# Patient Record
Sex: FEMALE | Race: ASIAN | NOT HISPANIC OR LATINO | Employment: UNEMPLOYED | URBAN - METROPOLITAN AREA
[De-identification: names, ages, dates, MRNs, and addresses within clinical notes are randomized per-mention and may not be internally consistent; named-entity substitution may affect disease eponyms.]

---

## 2018-06-12 ENCOUNTER — HOSPITAL ENCOUNTER (OUTPATIENT)
Dept: LAB | Facility: MEDICAL CENTER | Age: 63
End: 2018-06-12
Attending: FAMILY MEDICINE

## 2018-06-12 ENCOUNTER — HOSPITAL ENCOUNTER (OUTPATIENT)
Facility: MEDICAL CENTER | Age: 63
End: 2018-06-12
Attending: FAMILY MEDICINE

## 2018-06-18 LAB — HEMOCCULT STL QL IA: NEGATIVE

## 2021-02-26 ENCOUNTER — PATIENT OUTREACH (OUTPATIENT)
Dept: OTHER | Facility: MEDICAL CENTER | Age: 66
End: 2021-02-26

## 2021-02-26 NOTE — LETTER
[facility]   [address]  [place] [address]  Phone: [phone] - Fax: [phone]              Address:  [place] [address]     Date: 02/26/21  Medical Record Number: [id number]    Dear Nain,    I am a Cancer Nurse Navigator, a certified oncology nurse. My role is to assess any needs you may have with education, guidance and support. I am available to you and your family from diagnosis through your survivorship.       I am available to address your needs during your journey with the following services:     Care Coordination  I can assist you in facilitating communication between your cancer care treatment team to ensure timely treatment and follow-up.  I can also assist with transition of care back to your primary care provider, or other specialist, as needed.  My goal is to bridge gaps for you throughout the course of your active treatment.       Education Services  Understanding the recommended treatment course by your physician is key. I can provide educational resources personalized to your cancer diagnosis to help you understand your diagnosis and treatment. Please let me know if you would like to receive information about your diagnosis and treatment plan.  I am here to help.     Support Services/Resource Information  [facility] we offer a full scope of support services.  I can assist you with referral information to:  · Cancer Clinical Trials & Research  · Nutrition counseling  · Support groups  · Complementary Therapies such as Healing Touch and Mind-Body Techniques Meditation  · Patient Financial Advocates  ·   · [facility], an [employer] affiliate office, our volunteers can assist you with accessing our Prescription Eyewearing library, support services information, head coverings and comfort items  · Community and national resources, included eligibility based elena assistance and pharmaceutical access programs,  if you are in need of additional information.     [facility] offers services that include:  · Behavioral Health  · Genetic counseling & testing  · Acupuncture  · Lymphedema prevention/treatment program  · Palliative care services.       I hope you have an excellent patient experience.  Please feel free to share with me your comments regarding the care you have received- we value your feedback.    Sincerely,       Raji Pratt R.N.  Cancer Nurse Navigator    Office: [phone]   Email: [email or web address]    If you would like to attend our Breast Cancer Newly Diagnosed Class please call [phone] or visit [email or web address] to register.

## 2021-03-03 DIAGNOSIS — Z23 NEED FOR VACCINATION: ICD-10-CM

## 2021-03-09 ENCOUNTER — PATIENT OUTREACH (OUTPATIENT)
Dept: OTHER | Facility: MEDICAL CENTER | Age: 66
End: 2021-03-09

## 2021-03-09 DIAGNOSIS — Z65.8 PSYCHOSOCIAL DISTRESS: ICD-10-CM

## 2021-03-09 NOTE — PROGRESS NOTES
"On March 9, 2021, Oncology Social Worker Jeanette Richardson and Oncology Nurse Navigator Raji Pratt contacted pt. via telephone.  Pt. asked for SELINA Richardson and PAULETTE Pratt to speak to her daughter, Elieser as her English is not as good as her daughter's.  Pt.'s daughter shared they are waiting for price for surgery from [facility].  Elieser shared they were quoted price to be around $4000 and would be assisted with $1000 from [facility].      Pt. lives with her  who works and often takes care of her great grandson Chon (7 years old).  Pt. does not work and receives $700/month from social security.  Elieser shared there are grandchildren and pt.'s adult children who can drive her to and from appointments.      Pt. shared she was a 5 on psychosocial distress screening re: \"not too much stress/I try not to get depressed\"      Pt. shared she is depressed.     Pt.'s primary language is Amharic.      SELINA Richardson will be mailing out [facility] application for pt. to complete.  SELINA Richardson encouraged pt.'s daughter and pt. to call if they have any concerns.  SELINA Richardson provided pt.'s daughter with her contact information and the contact information for PAULETTE Pratt.    "

## 2021-03-11 ENCOUNTER — HOSPITAL ENCOUNTER (OUTPATIENT)
Facility: MEDICAL CENTER | Age: 66
End: 2021-03-11
Attending: SURGERY
Payer: COMMERCIAL

## 2021-03-11 LAB — PATHOLOGY CONSULT NOTE: NORMAL

## 2021-03-11 NOTE — PROGRESS NOTES
Oncology Nurse Parish Pratt and Oncology Social Worker Jeanette Richardson phoned pt.  Pt's primary language is Esa.  Pt requested we speak with daughter Elieser.  Pt awaiting final quote for surgery from [facility].  Pt is established with Dr. Bailon.  Daughter states that [facility] may be able to provide some financial assistance.  OSW Richardson discussed additional resources.  Pt lives with her .  She has family available for support and to assist with transportation.  Requested pt / daughter call should any questions or concerns arise.

## 2021-03-18 ENCOUNTER — PRE-ADMISSION TESTING (OUTPATIENT)
Dept: ADMISSIONS | Facility: MEDICAL CENTER | Age: 66
End: 2021-03-18
Attending: SURGERY
Payer: COMMERCIAL

## 2021-03-18 DIAGNOSIS — Z01.812 PRE-OPERATIVE LABORATORY EXAMINATION: ICD-10-CM

## 2021-03-18 LAB
SARS-COV-2 RNA RESP QL NAA+PROBE: NOTDETECTED
SPECIMEN SOURCE: NORMAL

## 2021-03-18 PROCEDURE — U0005 INFEC AGEN DETEC AMPLI PROBE: HCPCS

## 2021-03-18 PROCEDURE — C9803 HOPD COVID-19 SPEC COLLECT: HCPCS

## 2021-03-18 PROCEDURE — U0003 INFECTIOUS AGENT DETECTION BY NUCLEIC ACID (DNA OR RNA); SEVERE ACUTE RESPIRATORY SYNDROME CORONAVIRUS 2 (SARS-COV-2) (CORONAVIRUS DISEASE [COVID-19]), AMPLIFIED PROBE TECHNIQUE, MAKING USE OF HIGH THROUGHPUT TECHNOLOGIES AS DESCRIBED BY CMS-2020-01-R: HCPCS

## 2021-03-22 NOTE — OR NURSING
COVID-19 Pre-surgery screening:    Pt did not answer generic voicemail was left with call back number.

## 2021-03-23 ENCOUNTER — ANESTHESIA EVENT (OUTPATIENT)
Dept: SURGERY | Facility: MEDICAL CENTER | Age: 66
End: 2021-03-23
Payer: COMMERCIAL

## 2021-03-23 ENCOUNTER — APPOINTMENT (OUTPATIENT)
Dept: RADIOLOGY | Facility: MEDICAL CENTER | Age: 66
End: 2021-03-23
Attending: SURGERY
Payer: COMMERCIAL

## 2021-03-23 ENCOUNTER — ANESTHESIA (OUTPATIENT)
Dept: SURGERY | Facility: MEDICAL CENTER | Age: 66
End: 2021-03-23
Payer: COMMERCIAL

## 2021-03-23 ENCOUNTER — HOSPITAL ENCOUNTER (OUTPATIENT)
Facility: MEDICAL CENTER | Age: 66
End: 2021-03-23
Attending: SURGERY | Admitting: SURGERY
Payer: COMMERCIAL

## 2021-03-23 VITALS
HEIGHT: 63 IN | WEIGHT: 160.5 LBS | BODY MASS INDEX: 28.44 KG/M2 | OXYGEN SATURATION: 100 % | SYSTOLIC BLOOD PRESSURE: 139 MMHG | DIASTOLIC BLOOD PRESSURE: 66 MMHG | TEMPERATURE: 98.4 F | HEART RATE: 95 BPM | RESPIRATION RATE: 16 BRPM

## 2021-03-23 DIAGNOSIS — C50.211 MALIGNANT NEOPLASM OF UPPER-INNER QUADRANT OF RIGHT FEMALE BREAST, UNSPECIFIED ESTROGEN RECEPTOR STATUS (HCC): ICD-10-CM

## 2021-03-23 PROCEDURE — 160035 HCHG PACU - 1ST 60 MINS PHASE I: Performed by: SURGERY

## 2021-03-23 PROCEDURE — 160025 RECOVERY II MINUTES (STATS): Performed by: SURGERY

## 2021-03-23 PROCEDURE — 160009 HCHG ANES TIME/MIN: Performed by: SURGERY

## 2021-03-23 PROCEDURE — 88331 PATH CONSLTJ SURG 1 BLK 1SPC: CPT

## 2021-03-23 PROCEDURE — 88307 TISSUE EXAM BY PATHOLOGIST: CPT

## 2021-03-23 PROCEDURE — 501838 HCHG SUTURE GENERAL: Performed by: SURGERY

## 2021-03-23 PROCEDURE — 160046 HCHG PACU - 1ST 60 MINS PHASE II: Performed by: SURGERY

## 2021-03-23 PROCEDURE — 160041 HCHG SURGERY MINUTES - EA ADDL 1 MIN LEVEL 4: Performed by: SURGERY

## 2021-03-23 PROCEDURE — A9270 NON-COVERED ITEM OR SERVICE: HCPCS | Performed by: ANESTHESIOLOGY

## 2021-03-23 PROCEDURE — 38792 RA TRACER ID OF SENTINL NODE: CPT | Mod: RT

## 2021-03-23 PROCEDURE — 700111 HCHG RX REV CODE 636 W/ 250 OVERRIDE (IP): Performed by: ANESTHESIOLOGY

## 2021-03-23 PROCEDURE — 700102 HCHG RX REV CODE 250 W/ 637 OVERRIDE(OP): Performed by: ANESTHESIOLOGY

## 2021-03-23 PROCEDURE — 76098 X-RAY EXAM SURGICAL SPECIMEN: CPT | Mod: RT

## 2021-03-23 PROCEDURE — 160047 HCHG PACU  - EA ADDL 30 MINS PHASE II: Performed by: SURGERY

## 2021-03-23 PROCEDURE — 160029 HCHG SURGERY MINUTES - 1ST 30 MINS LEVEL 4: Performed by: SURGERY

## 2021-03-23 PROCEDURE — 160048 HCHG OR STATISTICAL LEVEL 1-5: Performed by: SURGERY

## 2021-03-23 PROCEDURE — 700101 HCHG RX REV CODE 250: Performed by: SURGERY

## 2021-03-23 PROCEDURE — 160002 HCHG RECOVERY MINUTES (STAT): Performed by: SURGERY

## 2021-03-23 RX ORDER — CEFAZOLIN SODIUM 1 G/3ML
INJECTION, POWDER, FOR SOLUTION INTRAMUSCULAR; INTRAVENOUS PRN
Status: DISCONTINUED | OUTPATIENT
Start: 2021-03-23 | End: 2021-03-23 | Stop reason: SURG

## 2021-03-23 RX ORDER — ONDANSETRON 2 MG/ML
4 INJECTION INTRAMUSCULAR; INTRAVENOUS
Status: DISCONTINUED | OUTPATIENT
Start: 2021-03-23 | End: 2021-03-23 | Stop reason: HOSPADM

## 2021-03-23 RX ORDER — OXYCODONE HCL 5 MG/5 ML
10 SOLUTION, ORAL ORAL
Status: COMPLETED | OUTPATIENT
Start: 2021-03-23 | End: 2021-03-23

## 2021-03-23 RX ORDER — SODIUM CHLORIDE, SODIUM LACTATE, POTASSIUM CHLORIDE, CALCIUM CHLORIDE 600; 310; 30; 20 MG/100ML; MG/100ML; MG/100ML; MG/100ML
INJECTION, SOLUTION INTRAVENOUS CONTINUOUS
Status: DISCONTINUED | OUTPATIENT
Start: 2021-03-23 | End: 2021-03-23 | Stop reason: HOSPADM

## 2021-03-23 RX ORDER — ONDANSETRON 4 MG/1
4 TABLET, FILM COATED ORAL EVERY 8 HOURS PRN
Qty: 9 TABLET | Refills: 2 | Status: SHIPPED | OUTPATIENT
Start: 2021-03-23 | End: 2021-03-26

## 2021-03-23 RX ORDER — HALOPERIDOL 5 MG/ML
1 INJECTION INTRAMUSCULAR
Status: DISCONTINUED | OUTPATIENT
Start: 2021-03-23 | End: 2021-03-23 | Stop reason: HOSPADM

## 2021-03-23 RX ORDER — BUPIVACAINE HYDROCHLORIDE AND EPINEPHRINE 5; 5 MG/ML; UG/ML
INJECTION, SOLUTION EPIDURAL; INTRACAUDAL; PERINEURAL
Status: DISCONTINUED | OUTPATIENT
Start: 2021-03-23 | End: 2021-03-23 | Stop reason: HOSPADM

## 2021-03-23 RX ORDER — ISOSULFAN BLUE 50 MG/5ML
INJECTION, SOLUTION SUBCUTANEOUS
Status: DISPENSED
Start: 2021-03-23 | End: 2021-03-23

## 2021-03-23 RX ORDER — SODIUM CHLORIDE, SODIUM LACTATE, POTASSIUM CHLORIDE, CALCIUM CHLORIDE 600; 310; 30; 20 MG/100ML; MG/100ML; MG/100ML; MG/100ML
INJECTION, SOLUTION INTRAVENOUS CONTINUOUS
Status: ACTIVE | OUTPATIENT
Start: 2021-03-23 | End: 2021-03-23

## 2021-03-23 RX ORDER — ONDANSETRON 2 MG/ML
INJECTION INTRAMUSCULAR; INTRAVENOUS PRN
Status: DISCONTINUED | OUTPATIENT
Start: 2021-03-23 | End: 2021-03-23 | Stop reason: SURG

## 2021-03-23 RX ORDER — HYDROMORPHONE HYDROCHLORIDE 1 MG/ML
0.1 INJECTION, SOLUTION INTRAMUSCULAR; INTRAVENOUS; SUBCUTANEOUS
Status: DISCONTINUED | OUTPATIENT
Start: 2021-03-23 | End: 2021-03-23 | Stop reason: HOSPADM

## 2021-03-23 RX ORDER — DEXAMETHASONE SODIUM PHOSPHATE 4 MG/ML
INJECTION, SOLUTION INTRA-ARTICULAR; INTRALESIONAL; INTRAMUSCULAR; INTRAVENOUS; SOFT TISSUE PRN
Status: DISCONTINUED | OUTPATIENT
Start: 2021-03-23 | End: 2021-03-23 | Stop reason: SURG

## 2021-03-23 RX ORDER — OXYCODONE HCL 5 MG/5 ML
5 SOLUTION, ORAL ORAL
Status: COMPLETED | OUTPATIENT
Start: 2021-03-23 | End: 2021-03-23

## 2021-03-23 RX ORDER — HYDROCODONE BITARTRATE AND ACETAMINOPHEN 5; 325 MG/1; MG/1
1 TABLET ORAL EVERY 4 HOURS PRN
Qty: 18 TABLET | Refills: 0 | Status: SHIPPED | OUTPATIENT
Start: 2021-03-23 | End: 2021-03-26

## 2021-03-23 RX ORDER — DIPHENHYDRAMINE HYDROCHLORIDE 50 MG/ML
12.5 INJECTION INTRAMUSCULAR; INTRAVENOUS
Status: DISCONTINUED | OUTPATIENT
Start: 2021-03-23 | End: 2021-03-23 | Stop reason: HOSPADM

## 2021-03-23 RX ORDER — HYDROMORPHONE HYDROCHLORIDE 1 MG/ML
0.2 INJECTION, SOLUTION INTRAMUSCULAR; INTRAVENOUS; SUBCUTANEOUS
Status: DISCONTINUED | OUTPATIENT
Start: 2021-03-23 | End: 2021-03-23 | Stop reason: HOSPADM

## 2021-03-23 RX ORDER — HYDROMORPHONE HYDROCHLORIDE 1 MG/ML
0.4 INJECTION, SOLUTION INTRAMUSCULAR; INTRAVENOUS; SUBCUTANEOUS
Status: DISCONTINUED | OUTPATIENT
Start: 2021-03-23 | End: 2021-03-23 | Stop reason: HOSPADM

## 2021-03-23 RX ORDER — ALPRAZOLAM 0.25 MG/1
.25-.5 TABLET ORAL
Status: DISCONTINUED | OUTPATIENT
Start: 2021-03-23 | End: 2021-03-23 | Stop reason: HOSPADM

## 2021-03-23 RX ORDER — BUPIVACAINE HYDROCHLORIDE 5 MG/ML
INJECTION, SOLUTION EPIDURAL; INTRACAUDAL
Status: DISPENSED
Start: 2021-03-23 | End: 2021-03-23

## 2021-03-23 RX ORDER — LIDOCAINE AND PRILOCAINE 25; 25 MG/G; MG/G
CREAM TOPICAL
Status: COMPLETED | OUTPATIENT
Start: 2021-03-23 | End: 2021-03-23

## 2021-03-23 RX ADMIN — PROPOFOL 200 MG: 10 INJECTION, EMULSION INTRAVENOUS at 16:16

## 2021-03-23 RX ADMIN — CEFAZOLIN 2 G: 330 INJECTION, POWDER, FOR SOLUTION INTRAMUSCULAR; INTRAVENOUS at 16:03

## 2021-03-23 RX ADMIN — FENTANYL CITRATE 150 MCG: 50 INJECTION, SOLUTION INTRAMUSCULAR; INTRAVENOUS at 16:20

## 2021-03-23 RX ADMIN — LIDOCAINE AND PRILOCAINE 2.5 %: 25; 25 CREAM TOPICAL at 10:10

## 2021-03-23 RX ADMIN — ONDANSETRON 4 MG: 2 INJECTION INTRAMUSCULAR; INTRAVENOUS at 16:16

## 2021-03-23 RX ADMIN — FENTANYL CITRATE 100 MCG: 50 INJECTION, SOLUTION INTRAMUSCULAR; INTRAVENOUS at 16:16

## 2021-03-23 RX ADMIN — DEXAMETHASONE SODIUM PHOSPHATE 8 MG: 4 INJECTION, SOLUTION INTRA-ARTICULAR; INTRALESIONAL; INTRAMUSCULAR; INTRAVENOUS; SOFT TISSUE at 16:16

## 2021-03-23 RX ADMIN — OXYCODONE HYDROCHLORIDE 5 MG: 5 SOLUTION ORAL at 18:10

## 2021-03-23 ASSESSMENT — PAIN DESCRIPTION - PAIN TYPE
TYPE: SURGICAL PAIN

## 2021-03-23 ASSESSMENT — PAIN SCALES - GENERAL: PAIN_LEVEL: 3

## 2021-03-23 NOTE — ANESTHESIA PREPROCEDURE EVALUATION
Relevant Problems   No relevant active problems       Physical Exam    Airway   Mallampati: I  TM distance: >3 FB  Neck ROM: full       Cardiovascular   Rhythm: regular  Rate: normal     Dental - normal exam           Pulmonary   Breath sounds clear to auscultation     Abdominal - normal exam     Neurological - normal exam                 Anesthesia Plan    ASA 1       Plan - general       Airway plan will be LMA        Plan Factors:   Patient was not previously instructed to abstain from smoking on day of procedure.  Patient did not smoke on day of procedure.      Induction: intravenous          Informed Consent:    Anesthetic plan and risks discussed with patient.    Use of blood products discussed with: patient whom consented to blood products.

## 2021-03-23 NOTE — ANESTHESIA PROCEDURE NOTES
Airway    Date/Time: 3/23/2021 4:15 PM  Performed by: Yobany Galvan M.D.  Authorized by: Yobany Galvan M.D.     Location:  OR  Urgency:  Elective  Difficult Airway: No    Indications for Airway Management:  Anesthesia      Spontaneous Ventilation: present    Sedation Level:  Deep  Preoxygenated: Yes    Patient Position:  Sniffing  MILS Maintained Throughout: No    Mask Difficulty Assessment:  0 - not attempted  Final Airway Type:  Supraglottic airway  Final Supraglottic Airway:  Standard LMA    SGA Size:  4  Number of Attempts at Approach:  1

## 2021-03-24 LAB — PATHOLOGY CONSULT NOTE: NORMAL

## 2021-03-24 NOTE — OR NURSING
1737-Received from OR via [employer]. S/P-Right partial mastectomy. Gauze and tegaderm intact.   Bedside report received from RN. And Anesthesiologist Dr Galvan    1800-Remains asleep. vss per baseline.    1810-medicated for pain.    1815-phase 1 complete. Called  and arranged time for .    1930-meets discharge criteria. Iv removed. Instructions explained to  and patient.  All questions answered. Discharged home with responsible party. Escorted to car via wheelchair.

## 2021-03-24 NOTE — DISCHARGE INSTRUCTIONS
ACTIVITY: Rest and take it easy for the first 24 hours.  A responsible adult is recommended to remain with you during that time.  It is normal to feel sleepy.  We encourage you to not do anything that requires balance, judgment or coordination.    MILD FLU-LIKE SYMPTOMS ARE NORMAL. YOU MAY EXPERIENCE GENERALIZED MUSCLE ACHES, THROAT IRRITATION, HEADACHE AND/OR SOME NAUSEA.    FOR 24 HOURS DO NOT:  Drive, operate machinery or run household appliances.  Drink beer or alcoholic beverages.   Make important decisions or sign legal documents.    SPECIAL INSTRUCTIONS:   May shower but no baths for 4 weeks.    Ambulate regularly.    Sports bra for 2 weeks    DIET: To avoid nausea, slowly advance diet as tolerated, avoiding spicy or greasy foods for the first day.  Add more substantial food to your diet according to your physician's instructions.  Babies can be fed formula or breast milk as soon as they are hungry.  INCREASE FLUIDS AND FIBER TO AVOID CONSTIPATION.        FOLLOW-UP APPOINTMENT:  A follow-up appointment should be arranged with your doctor; call to schedule.    You should CALL YOUR PHYSICIAN if you develop:  Fever greater than 101 degrees F.  Pain not relieved by medication, or persistent nausea or vomiting.  Excessive bleeding (blood soaking through dressing) or unexpected drainage from the wound.  Extreme redness or swelling around the incision site, drainage of pus or foul smelling drainage.  Inability to urinate or empty your bladder within 8 hours.  Problems with breathing or chest pain.    You should call 911 if you develop problems with breathing or chest pain.  If you are unable to contact your doctor or surgical center, you should go to the nearest emergency room or urgent care center.  Physician's telephone #: [phone] Dr Bailon    If any questions arise, call your doctor.  If your doctor is not available, please feel free to call the Surgical Center at [phone]. The Contact Center is open  Monday through Friday 7AM to 5PM and may speak to a nurse at [phone], or toll free at [phone].     A registered nurse may call you a few days after your surgery to see how you are doing after your procedure.    MEDICATIONS: Resume taking daily medication.  Take prescribed pain medication with food.  If no medication is prescribed, you may take non-aspirin pain medication if needed.  PAIN MEDICATION CAN BE VERY CONSTIPATING.  Take a stool softener or laxative such as senokot, pericolace, or milk of magnesia if needed.    Prescription given for Norco and Zofran.  Last pain medication given at 6:00pm. You can take your next dose of Norco at 10:00pm.    If your physician has prescribed pain medication that includes Acetaminophen (Tylenol), do not take additional Acetaminophen (Tylenol) while taking the prescribed medication.    Depression / Suicide Risk    As you are discharged from this [facility] facility, it is important to learn how to keep safe from harming yourself.    Recognize the warning signs:  · Abrupt changes in personality, positive or negative- including increase in energy   · Giving away possessions  · Change in eating patterns- significant weight changes-  positive or negative  · Change in sleeping patterns- unable to sleep or sleeping all the time   · Unwillingness or inability to communicate  · Depression  · Unusual sadness, discouragement and loneliness  · Talk of wanting to die  · Neglect of personal appearance   · Rebelliousness- reckless behavior  · Withdrawal from people/activities they love  · Confusion- inability to concentrate     If you or a loved one observes any of these behaviors or has concerns about self-harm, here's what you can do:  · Talk about it- your feelings and reasons for harming yourself  · Remove any means that you might use to hurt yourself (examples: pills, rope, extension cords, firearm)  · Get professional help from the community (Mental Health, Substance  Abuse, psychological counseling)  · Do not be alone:Call your Safe Contact- someone whom you trust who will be there for you.  · Call your local CRISIS HOTLINE 408-0366 or 925-745-9337  · Call your local Children's Mobile Crisis Response Team [place] (494) 943-0205 or www.[employer].com  · Call the toll free National Suicide Prevention Hotlines   · National Suicide Prevention Lifeline 648-779-DBZL (6352)  · National Hope Line Network 800-SUICIDE (381-5594)

## 2021-03-24 NOTE — OP REPORT
Pre op diagnosis:  Malignant neoplasm right upper inner breast  Post op diagnosis:  Same    Procedure:   1.  Right partial mastectomy  2.  Right sentinel lymph node mapping with excision of deep axillary nodes    Surgeon:  Johnna Bailon MD  Assistant:  Kirti Thompson CFA  Anesthesiologist:  Yobany Galvan MD    Anesthesia:  General  Pre op meds:  Ancef  ASA 2    Indications:  This is a 66 year old female with right breast cancer, cT2 cN0, involving the dermis focally.  After discussing risks and benefits, she is ready to proceed with breast conserving surgery, but declines reconstruction.  She indicates that she is unconcerned about cosmetic outcome and prefers a wide excision.    Findings:  1.  1 of 2 sentinel nodes, without gross extranodal extension.  Counts to 220.  No residual palpable nodes.  2.  Margins grossly clear.    Summary:  The patient underwent radiotracer injection, then was anesthetized, prepped, and draped in sterile fashion.  Time out was confirmed.  Local anesthetic was injected prior to all incisions.      I made an axillary incision following her skin lines ,then dissected through the clavipectoral fascia.  The gamma probe was used to locate the sentinel nodes which were excised using the Harmonic scalpel.  These were evaluated by the pathologist.  Based on current data, risk of ALND was felt to outweigh the benefit, and I irrigated and ensured hemostasis.  I closed the fascia and deep dermal layer an 4-0 monocryl was used to close the skin.      I then made a crescent shaped excision of skin over the palpable mass and, per her wishes, widely excised around the palpable mass.  Specimen mammogram was performed, and the pathologist also evaluated the specimen. Margins were felt to be grossly clear.  I irrigated and ensured hemostasis, then placed clips.  I reapproximated the parenchyma to fill the defect, closed the deep dermal layer and 4-0 monocryl was used for skin.  Dressings were placed and I was  informed all counts were correct.  I required assistance for retraction and best exposure.       CC: Brandee Jones PA-C

## 2021-03-24 NOTE — ANESTHESIA TIME REPORT
Anesthesia Start and Stop Event Times     Date Time Event    3/23/2021 1556 Ready for Procedure     1603 Anesthesia Start     1738 Anesthesia Stop        Responsible Staff  03/23/21    Name Role Begin End    Yobany Galvan M.D. Anesth 1603 1738        Preop Diagnosis (Free Text):  Pre-op Diagnosis     RIGHT BREAST CANCER        Preop Diagnosis (Codes):    Post op Diagnosis  Breast cancer (HCC)      Premium Reason  A. 3PM - 7AM    Comments:

## 2021-03-24 NOTE — ANESTHESIA POSTPROCEDURE EVALUATION
Patient: Nain Mckeonayamongdamaso    Procedure Summary     Date: 03/23/21 Room / Location: [facility] ROOM 28 / SURGERY SAME DAY [facility]    Anesthesia Start: 1603 Anesthesia Stop: 1738    Procedures:       MASTECTOMY, PARTIAL (Right Breast)      MASTOPEXY-POSS (Right Breast)      MAMMOPLASTY, REDUCTION-OR (Right Breast)      BIOPSY, LYMPH NODE, SENTINEL (Right Breast)      LYMPHADENECTOMY, AXILLARY-POSS. (Right Breast) Diagnosis: (RIGHT BREAST CANCER)    Surgeons: Johnna Bailon M.D. Responsible Provider: Yobany Galvan M.D.    Anesthesia Type: general ASA Status: 1          Final Anesthesia Type: general  Last vitals  BP   Blood Pressure : 148/85    Temp   36.9 °C (98.5 °F)    Pulse   82   Resp   18    SpO2   100 %      Anesthesia Post Evaluation    Patient location during evaluation: PACU  Patient participation: complete - patient participated  Level of consciousness: awake  Pain score: 3    Airway patency: patent  Anesthetic complications: no  Cardiovascular status: adequate  Respiratory status: acceptable  Hydration status: acceptable    PONV: none          No complications documented.     Nurse Pain Score: 0 (NPRS)

## 2021-03-30 ENCOUNTER — PATIENT OUTREACH (OUTPATIENT)
Dept: OTHER | Facility: MEDICAL CENTER | Age: 66
End: 2021-03-30

## 2021-03-30 NOTE — PROGRESS NOTES
"On March 30th, 2021, Oncology Social Worker Jeanette Richardson and Oncology Nurse Navigator Raji Pratt contacted pt.'s daughter.  Pt.'s daughter shared pt. \"was doing good but there are moments when she has pain.\"  Telephone connection was cutting in and out making it difficult to understand pt.'s daughter at times.  Pt.'s daughter shared they had not received [facility] application and [facility] Fund application that SELINA Richardson had mailed out.  Pt.'s daughter provided her email address and asked for applications to be emailed to her at [email or web address]    "

## 2021-03-31 NOTE — PROGRESS NOTES
Oncology Social Worker Jeanette Richardson and Oncology Nurse Navigator TATA Pratt phoned pt for post op follow up.  Spoke with pt's daughter Talia.  Daughter states she did not complete the applications mailed to pt.  Applications will be emailed to daughter who states she will complete and return them to OSW Dylan.

## 2021-04-07 ENCOUNTER — HOSPITAL ENCOUNTER (OUTPATIENT)
Dept: LAB | Facility: MEDICAL CENTER | Age: 66
End: 2021-04-07
Attending: INTERNAL MEDICINE
Payer: COMMERCIAL

## 2021-04-07 PROBLEM — Z17.0 CARCINOMA OF UPPER-INNER QUADRANT OF RIGHT BREAST IN FEMALE, ESTROGEN RECEPTOR POSITIVE (HCC): Status: ACTIVE | Noted: 2021-04-07

## 2021-04-07 PROBLEM — C50.211 CARCINOMA OF UPPER-INNER QUADRANT OF RIGHT BREAST IN FEMALE, ESTROGEN RECEPTOR POSITIVE (HCC): Status: ACTIVE | Noted: 2021-04-07

## 2021-04-07 LAB
ALBUMIN SERPL BCP-MCNC: 4.2 G/DL (ref 3.2–4.9)
ALBUMIN/GLOB SERPL: 1.4 G/DL
ALP SERPL-CCNC: 109 U/L (ref 30–99)
ALT SERPL-CCNC: 47 U/L (ref 2–50)
ANION GAP SERPL CALC-SCNC: 7 MMOL/L (ref 7–16)
AST SERPL-CCNC: 37 U/L (ref 12–45)
BASOPHILS # BLD AUTO: 1.3 % (ref 0–1.8)
BASOPHILS # BLD: 0.07 K/UL (ref 0–0.12)
BILIRUB SERPL-MCNC: 0.4 MG/DL (ref 0.1–1.5)
BUN SERPL-MCNC: 21 MG/DL (ref 8–22)
CALCIUM SERPL-MCNC: 9.3 MG/DL (ref 8.5–10.5)
CHLORIDE SERPL-SCNC: 105 MMOL/L (ref 96–112)
CO2 SERPL-SCNC: 28 MMOL/L (ref 20–33)
CREAT SERPL-MCNC: 1.15 MG/DL (ref 0.5–1.4)
EOSINOPHIL # BLD AUTO: 0.23 K/UL (ref 0–0.51)
EOSINOPHIL NFR BLD: 4.3 % (ref 0–6.9)
ERYTHROCYTE [DISTWIDTH] IN BLOOD BY AUTOMATED COUNT: 47 FL (ref 35.9–50)
GLOBULIN SER CALC-MCNC: 3.1 G/DL (ref 1.9–3.5)
GLUCOSE SERPL-MCNC: 104 MG/DL (ref 65–99)
HCT VFR BLD AUTO: 44.4 % (ref 37–47)
HGB BLD-MCNC: 13.8 G/DL (ref 12–16)
IMM GRANULOCYTES # BLD AUTO: 0.03 K/UL (ref 0–0.11)
IMM GRANULOCYTES NFR BLD AUTO: 0.6 % (ref 0–0.9)
LYMPHOCYTES # BLD AUTO: 1.68 K/UL (ref 1–4.8)
LYMPHOCYTES NFR BLD: 31.8 % (ref 22–41)
MCH RBC QN AUTO: 25.1 PG (ref 27–33)
MCHC RBC AUTO-ENTMCNC: 31.1 G/DL (ref 33.6–35)
MCV RBC AUTO: 80.9 FL (ref 81.4–97.8)
MONOCYTES # BLD AUTO: 0.4 K/UL (ref 0–0.85)
MONOCYTES NFR BLD AUTO: 7.6 % (ref 0–13.4)
NEUTROPHILS # BLD AUTO: 2.88 K/UL (ref 2–7.15)
NEUTROPHILS NFR BLD: 54.4 % (ref 44–72)
NRBC # BLD AUTO: 0 K/UL
NRBC BLD-RTO: 0 /100 WBC
PLATELET # BLD AUTO: 274 K/UL (ref 164–446)
PMV BLD AUTO: 10.1 FL (ref 9–12.9)
POTASSIUM SERPL-SCNC: 4.2 MMOL/L (ref 3.6–5.5)
PROT SERPL-MCNC: 7.3 G/DL (ref 6–8.2)
RBC # BLD AUTO: 5.49 M/UL (ref 4.2–5.4)
SODIUM SERPL-SCNC: 140 MMOL/L (ref 135–145)
WBC # BLD AUTO: 5.3 K/UL (ref 4.8–10.8)

## 2021-04-07 PROCEDURE — 85025 COMPLETE CBC W/AUTO DIFF WBC: CPT

## 2021-04-07 PROCEDURE — 80053 COMPREHEN METABOLIC PANEL: CPT

## 2021-04-07 PROCEDURE — 82306 VITAMIN D 25 HYDROXY: CPT

## 2021-04-07 PROCEDURE — 36415 COLL VENOUS BLD VENIPUNCTURE: CPT

## 2021-04-08 ENCOUNTER — HOSPITAL ENCOUNTER (OUTPATIENT)
Dept: RADIATION ONCOLOGY | Facility: MEDICAL CENTER | Age: 66
End: 2021-04-30
Attending: RADIOLOGY
Payer: COMMERCIAL

## 2021-04-08 VITALS
HEART RATE: 74 BPM | TEMPERATURE: 97.2 F | OXYGEN SATURATION: 97 % | SYSTOLIC BLOOD PRESSURE: 156 MMHG | DIASTOLIC BLOOD PRESSURE: 80 MMHG

## 2021-04-08 DIAGNOSIS — Z17.0 CARCINOMA OF UPPER-INNER QUADRANT OF RIGHT BREAST IN FEMALE, ESTROGEN RECEPTOR POSITIVE (HCC): ICD-10-CM

## 2021-04-08 DIAGNOSIS — C50.211 CARCINOMA OF UPPER-INNER QUADRANT OF RIGHT BREAST IN FEMALE, ESTROGEN RECEPTOR POSITIVE (HCC): ICD-10-CM

## 2021-04-08 PROCEDURE — 99214 OFFICE O/P EST MOD 30 MIN: CPT | Performed by: RADIOLOGY

## 2021-04-08 PROCEDURE — 99205 OFFICE O/P NEW HI 60 MIN: CPT | Performed by: RADIOLOGY

## 2021-04-08 ASSESSMENT — PAIN SCALES - GENERAL: PAINLEVEL: NO PAIN

## 2021-04-08 NOTE — CONSULTS
RADIATION ONCOLOGY CONSULT    DATE OF SERVICE: 4/8/2021    IDENTIFICATION:  Stage IB ([id number]) G2 invasive ductal carcinoma of the right upper inner quadrant of breast ER/LA positive HER-2/jayme negative status post lumpectomy and sentinel lymph node biopsy on 3/23/2021 Oncotype test pending planning on an aromatase inhibitor    HISTORY OF PRESENT ILLNESS: I had the pleasure of seeing Ms. Delgado today in consultation at the request of Dr. Bailon for her breast cancer.  Patient is a 66-year-old woman who felt a lump in her right breast.  Diagnostic mammogram and ultrasound revealed a spiculated mass in this location measuring roughly 2 cm this was in the right upper inner quadrant of the breast.  There is not any associated abnormal adenopathy.  Biopsy of this area showed at least 5 mm of invasive ductal carcinoma with grade 1 features.  Tumor was ER/LA positive HER-2/jayme negative.  Patient elected to proceed with breast conservation management with a lumpectomy and sentinel lymph node biopsy on 3/23/2021.  This confirmed a 2.4 cm invasive ductal carcinoma with grade 2 features.  2 sentinel lymph nodes were identified and positive for malignancy 1 with macroscopic involvement at 5 mm the other with microscopic involvement.  There was not any evidence of extranodal extension or lymphovascular space invasion.  She is awaiting Oncotype test results.  She does plan to proceed with an aromatase inhibitor.  She presents to me today to further discuss the role of radiation as a part of her lymph node positive breast conservation management strategy.    PAST MEDICAL HISTORY:   Past Medical History:   Diagnosis Date   • Asthma    • Breath shortness     related to asthma   • Cancer (HCC)     breast   • Hypertension    • Snoring        PAST SURGICAL HISTORY:  Past Surgical History:   Procedure Laterality Date   • PB MASTECTOMY, PARTIAL Right 3/23/2021    Procedure: MASTECTOMY, PARTIAL;  Surgeon: Johnna Bailon M.D.;   "Location: SURGERY SAME DAY [facility];  Service: General   • PB SUSPENSION OF BREAST Right 3/23/2021    Procedure: MASTOPEXY-POSS;  Surgeon: Johnna Bailon M.D.;  Location: SURGERY SAME DAY [facility];  Service: General   • OK REDUCTION OF BREAST Right 3/23/2021    Procedure: MAMMOPLASTY, REDUCTION-OR;  Surgeon: Johnna Bailon M.D.;  Location: SURGERY SAME DAY [facility];  Service: General   • NODE BIOPSY SENTINEL Right 3/23/2021    Procedure: BIOPSY, LYMPH NODE, SENTINEL;  Surgeon: Johnna Bailon M.D.;  Location: SURGERY SAME DAY [facility];  Service: General   • AXILLARY NODE DISSECTION Right 3/23/2021    Procedure: LYMPHADENECTOMY, AXILLARY-POSS.;  Surgeon: Johnna Bailon M.D.;  Location: SURGERY SAME DAY [facility];  Service: General       GYNECOLOGICAL STATUS:  : 3 and Para: 3    HORMONE USE:  Contraceptive hormone use 0 years no hormone replacement therapy    CURRENT MEDICATIONS:  Current Outpatient Medications   Medication Sig Dispense Refill   • fluticasone-salmeterol (ADVAIR DISKUS) 100-50 MCG/DOSE AEROSOL POWDER, BREATH ACTIVATED Inhale 1 Puff every 12 hours.     • NON SPECIFIED Takes a blood pressure medication sometimes \"depending on how she feels\"       No current facility-administered medications for this encounter.       ALLERGIES:    Chicken allergy    FAMILY HISTORY:    Family History   Problem Relation Age of Onset   • Cancer Brother        SOCIAL HISTORY:    Social History     Tobacco Use   • Smoking status: Never Smoker   • Smokeless tobacco: Never Used   Substance Use Topics   • Alcohol use: Never   • Drug use: Never     Patient is retired from Work  Lives with:  who is with her on today's visit    REVIEW OF SYSTEMS:  A complete review of systems was completed in patient's chart on 2021.  All are negative with relationship to this diagnosis with the exception of:  Patient is healing well from surgery, does not have any suspicious lesions in the breast or axilla, denies any acute " areas of bony tenderness or deformity, denies any new headaches or neurologic symptoms    PHYSICAL EXAM:    Vitals:    21 1319   BP: 156/80   Pulse: 74   Temp: 36.2 °C (97.2 °F)   SpO2: 97%   Pain Score: No pain        ECO= Fully active, able to carry on all pre-disease performance without restriction.    PAIN:  0  GENERAL: No apparent distress.  HEENT:  Pupils are equal, round, and reactive to light.  Extraocular muscles   are intact. Sclerae nonicteric.  Conjunctivae pink.  Oral cavity, tongue   protrudes midline.   NECK:  Supple without evidence of thyromegaly.  NODES:  No peripheral adenopathy of the neck, supraclavicular fossa or axillae   bilaterally.  LUNGS:  Clear to ascultation bilaterally   HEART:  Regular rate and rhythm.  No murmur appreciated  BREAST: No suspicious lesions found in either breast or axilla.  ABDOMEN:  Soft. No evidence of hepatosplenomegaly.  Positive bowel sounds.  EXTREMITIES:  Without Edema.  NEUROLOGIC:  Cranial nerves II through XII were intact. Normal stance and gait motor and sensory grossly within normal limits        IMPRESSION:    Stage IB ([id number]) G2 invasive ductal carcinoma of the right upper inner quadrant of breast ER/IA positive HER-2/jayme negative status post lumpectomy and sentinel lymph node biopsy on 3/23/2021 Oncotype test pending planning on an aromatase inhibitor      RECOMMENDATIONS:   I discussed the diagnosis, prognosis, and treatment options over a 1 hr 5 min time period, 95% of that time dedicated to ongoing treatment management.  I discussed with the patient and her  the variables in the TNM  staging system important towards her staging.  We went over the favorable histology but lymph node positive setting.  I reemphasized the role of Oncotype test towards determination of her need for chemotherapy.  Next we discussed mastectomy versus lumpectomy and radiation.  We went over lumpectomy with and without radiation.  I discussed radiation to the  breast and lymphatics over 25 fractions without a boost.  She was given a simulation for April 27 1 PM.  She understands that if chemotherapy is required her radiation will be delayed until roughly 3 weeks after completion of chemotherapy.      We discussed the risks, benefits and side effects of treatment and the patient is amenable to treatment.  If patient has any questions or concerns, she should feel free to contact me.    Thank you for the opportunity to participate in her care.  If any questions or comments, please do not hesitate in calling.

## 2021-04-08 NOTE — NON-PROVIDER
"Patient was seen today in clinic with Dr. Mccullough for Consult.  Vitals signs and weight were obtained and pain assessment was completed.  Allergies and medications were reviewed with the patient.  Toxicities of treatment assessed.     Vitals/Pain:  Vitals:    04/08/21 1319   BP: 156/80   Pulse: 74   Temp: 36.2 °C (97.2 °F)   SpO2: 97%   Pain Score: No pain        Allergies:   Chicken allergy    Current Medications:  Current Outpatient Medications   Medication Sig Dispense Refill   • fluticasone-salmeterol (ADVAIR DISKUS) 100-50 MCG/DOSE AEROSOL POWDER, BREATH ACTIVATED Inhale 1 Puff every 12 hours.     • NON SPECIFIED Takes a blood pressure medication sometimes \"depending on how she feels\"       No current facility-administered medications for this encounter.         PCP:  Robert Espinoza, Med Ass't    "

## 2021-04-08 NOTE — CT SIMULATION
PATIENT NAME Nain Frenchapitayamongkoneil   PRIMARY PHYSICIAN Brandee Jones [id number]   REFERRING PHYSICIAN Johnna Bailon M.D. 1955     Carcinoma of upper-inner quadrant of right breast in female, estrogen receptor positive (HCC)  Staging form: Breast, AJCC 8th Edition  - Pathologic: Stage IB (pT2, pN1a, cM0, G2, ER+, MI+, HER2-) - Signed by Vaibhav Mccullough M.D. on 4/7/2021  Histologic grading system: 3 grade system         Treatment Planning CT Simulation      Order Questions     Question Answer Comment    Is this for a new course of treatment? Yes     Is this an Addendum? No     Implanted Device/Pacemaker No     Simulation Status Initial     Treatment Site Breast     Laterality Right     Treatment Technique 3D CRT     Other Technique(s) 3D     Treatment Pattern/Frequency Daily     Concurrent Chemotherapy No     CT Technique 3D     Slice Thickness 3mm     Scan Extent Chest     Bowel Preparation No     Treatment Device(s) Vac Grabiel      Wing Board     Patient Attire Gown     Patient Position Supine     Patient Orientation Head First     Arm Position Up     Treatment Machine No preference     Treatment Image Guidance Ports     Frequency (ports) Weekly     Other Orders Weekly Physics Check if chemo     Special Tx Procedure             Comments     Awaiting oncotype for chemo decision

## 2021-04-09 ENCOUNTER — PATIENT OUTREACH (OUTPATIENT)
Dept: OTHER | Facility: MEDICAL CENTER | Age: 66
End: 2021-04-09

## 2021-04-09 LAB — 25(OH)D3 SERPL-MCNC: 13 NG/ML (ref 30–80)

## 2021-04-09 NOTE — PROGRESS NOTES
Oncology Nurse Navigator Terence and Oncology Social Worker Jeanette Richardson phone pt for follow up.  No answer, lvm requesting call back.

## 2021-04-12 NOTE — PROGRESS NOTES
On April 9, 2021, Oncology Social Worker Jeanette Richardson and Oncology Nurse Navigator Raji Pratt attempted telephone contact with pt.'s daughter.  OSW Dylan left voicemail message for pt.'s daughter requesting call back at earliest convenience.  OSW Dylan left contact information in voicemail message for PAULETTE Pratt and SELINA Richardson.

## 2021-04-15 ENCOUNTER — HOSPITAL ENCOUNTER (OUTPATIENT)
Dept: RADIOLOGY | Facility: MEDICAL CENTER | Age: 66
End: 2021-04-15
Payer: COMMERCIAL

## 2021-04-23 ENCOUNTER — HOSPITAL ENCOUNTER (OUTPATIENT)
Dept: RADIOLOGY | Facility: MEDICAL CENTER | Age: 66
End: 2021-04-23
Payer: COMMERCIAL

## 2021-04-27 ENCOUNTER — HOSPITAL ENCOUNTER (OUTPATIENT)
Dept: RADIATION ONCOLOGY | Facility: MEDICAL CENTER | Age: 66
End: 2021-04-27
Payer: MEDICARE

## 2021-04-27 ENCOUNTER — PATIENT OUTREACH (OUTPATIENT)
Dept: OTHER | Facility: MEDICAL CENTER | Age: 66
End: 2021-04-27

## 2021-04-27 PROCEDURE — 77334 RADIATION TREATMENT AID(S): CPT | Mod: 26 | Performed by: RADIOLOGY

## 2021-04-27 PROCEDURE — 77334 RADIATION TREATMENT AID(S): CPT | Performed by: RADIOLOGY

## 2021-04-27 PROCEDURE — 77290 THER RAD SIMULAJ FIELD CPLX: CPT | Mod: 26 | Performed by: RADIOLOGY

## 2021-04-27 PROCEDURE — 77470 SPECIAL RADIATION TREATMENT: CPT | Performed by: RADIOLOGY

## 2021-04-27 PROCEDURE — 77290 THER RAD SIMULAJ FIELD CPLX: CPT | Performed by: RADIOLOGY

## 2021-04-27 PROCEDURE — 77263 THER RADIOLOGY TX PLNG CPLX: CPT | Performed by: RADIOLOGY

## 2021-04-27 NOTE — PROGRESS NOTES
On April 27, 2021,Oncology Social Worker Jeanette Richardson attempted telephone contact with pt.'s daughter.  OSW Dylan left voicemail message for pt.'s daughter requesting pt. call back at earliest convenience.  OSW Dylan left contact information in voicemail message.

## 2021-04-27 NOTE — PROGRESS NOTES
On April 27, 2021, Oncology Social Worker Jeanette Richardson met briefly with pt. anabella ESQUIVEL in Radiation Oncology.  Pt. was alone.  SELINA Richardson provided pt. with [facility] and [employer] applications.  SELINA Richardson asked for pt.'s daughter to call SELINA Richardson.  Pt. shared she would tell her daughter.

## 2021-05-03 ENCOUNTER — HOSPITAL ENCOUNTER (OUTPATIENT)
Dept: RADIATION ONCOLOGY | Facility: MEDICAL CENTER | Age: 66
End: 2021-05-31
Attending: RADIOLOGY
Payer: COMMERCIAL

## 2021-05-03 PROCEDURE — 77334 RADIATION TREATMENT AID(S): CPT | Mod: 26 | Performed by: RADIOLOGY

## 2021-05-03 PROCEDURE — 77295 3-D RADIOTHERAPY PLAN: CPT | Performed by: RADIOLOGY

## 2021-05-03 PROCEDURE — 77295 3-D RADIOTHERAPY PLAN: CPT | Mod: 26 | Performed by: RADIOLOGY

## 2021-05-03 PROCEDURE — 77334 RADIATION TREATMENT AID(S): CPT | Performed by: RADIOLOGY

## 2021-05-03 PROCEDURE — 77300 RADIATION THERAPY DOSE PLAN: CPT | Mod: 26 | Performed by: RADIOLOGY

## 2021-05-03 PROCEDURE — 77300 RADIATION THERAPY DOSE PLAN: CPT | Performed by: RADIOLOGY

## 2021-05-04 ENCOUNTER — HOSPITAL ENCOUNTER (OUTPATIENT)
Dept: RADIATION ONCOLOGY | Facility: MEDICAL CENTER | Age: 66
End: 2021-05-04
Payer: MEDICARE

## 2021-05-04 LAB
CHEMOTHERAPY INFUSION START DATE: NORMAL
CHEMOTHERAPY RECORDS: 2
CHEMOTHERAPY RECORDS: 2
CHEMOTHERAPY RECORDS: 5000
CHEMOTHERAPY RECORDS: 5000
CHEMOTHERAPY RECORDS: NORMAL
CHEMOTHERAPY RX CANCER: NORMAL
DATE 1ST CHEMO CANCER: NORMAL
RAD ONC ARIA COURSE LAST TREATMENT DATE: NORMAL
RAD ONC ARIA COURSE TREATMENT ELAPSED DAYS: NORMAL
RAD ONC ARIA REFERENCE POINT DOSAGE GIVEN TO DATE: 2
RAD ONC ARIA REFERENCE POINT ID: NORMAL
RAD ONC ARIA REFERENCE POINT SESSION DOSAGE GIVEN: 2

## 2021-05-04 PROCEDURE — 77280 THER RAD SIMULAJ FIELD SMPL: CPT | Performed by: RADIOLOGY

## 2021-05-04 PROCEDURE — 77280 THER RAD SIMULAJ FIELD SMPL: CPT | Mod: 26 | Performed by: RADIOLOGY

## 2021-05-04 PROCEDURE — 77412 RADIATION TX DELIVERY LVL 3: CPT | Performed by: RADIOLOGY

## 2021-05-05 ENCOUNTER — HOSPITAL ENCOUNTER (OUTPATIENT)
Dept: RADIATION ONCOLOGY | Facility: MEDICAL CENTER | Age: 66
End: 2021-05-05
Payer: MEDICARE

## 2021-05-05 VITALS
DIASTOLIC BLOOD PRESSURE: 79 MMHG | SYSTOLIC BLOOD PRESSURE: 150 MMHG | TEMPERATURE: 97.9 F | OXYGEN SATURATION: 95 % | HEART RATE: 84 BPM

## 2021-05-05 LAB
CHEMOTHERAPY INFUSION START DATE: NORMAL
CHEMOTHERAPY RECORDS: 2
CHEMOTHERAPY RECORDS: 2
CHEMOTHERAPY RECORDS: 5000
CHEMOTHERAPY RECORDS: 5000
CHEMOTHERAPY RECORDS: NORMAL
CHEMOTHERAPY RX CANCER: NORMAL
DATE 1ST CHEMO CANCER: NORMAL
RAD ONC ARIA COURSE LAST TREATMENT DATE: NORMAL
RAD ONC ARIA COURSE TREATMENT ELAPSED DAYS: NORMAL
RAD ONC ARIA REFERENCE POINT DOSAGE GIVEN TO DATE: 4
RAD ONC ARIA REFERENCE POINT ID: NORMAL
RAD ONC ARIA REFERENCE POINT SESSION DOSAGE GIVEN: 2

## 2021-05-05 PROCEDURE — 77412 RADIATION TX DELIVERY LVL 3: CPT | Performed by: RADIOLOGY

## 2021-05-05 ASSESSMENT — PAIN SCALES - GENERAL: PAINLEVEL: NO PAIN

## 2021-05-05 NOTE — ON TREATMENT VISIT
ON TREATMENT NOTE  RADIATION ONCOLOGY DEPARTMENT    Patient name:  Nain Frenchapitayamongkol    Primary Physician:  WALT Walsh MRN: [id number]  [facility]: [id number]   Referring physician:  Johnna Bailon M.D. : 1955, 66 y.o.     ENCOUNTER DATE:  21    DIAGNOSIS:    Carcinoma of upper-inner quadrant of right breast in female, estrogen receptor positive (HCC)  Staging form: Breast, AJCC 8th Edition  - Pathologic: Stage IB (pT2, pN1a, cM0, G2, ER+, AK+, HER2-) - Signed by Vaibhav Mccullough M.D. on 2021  Histologic grading system: 3 grade system      TREATMENT SUMMARY:  Aria Treatment Information        Some values may be hidden. Unless noted otherwise, only the newest values recorded on each date are displayed.         Aria Treatment Summary 21   Course First Treatment Date 2021   Course Last Treatment Date 2021   Rt Breast Plan from Course C1_RBreast   Fraction 2 of 25   Elapsed Course Days 1 @    Prescribed Fraction Dose 200 cGy   Prescribed Total Dose 5,000 cGy   Rt SCV Plan from Course C1_RBreast   Fraction 2 of 25   Elapsed Course Days  @    Prescribed Fraction Dose 200 cGy   Prescribed Total Dose 5,000 cGy   Rt Breast Reference Point from Course C1_RBreast   Elapsed Course Days  @    Session Dose 200 cGy   Total Dose 400 cGy   Rt Breast CP Reference Point from Course C1_RBreast   Elapsed Course Days  @    Session Dose 200 cGy   Total Dose 400 cGy   Rt SCV Reference Point from Course C1_RBreast   Elapsed Course Days 1 @    Session Dose 200 cGy   Total Dose 400 cGy   Rt SCV CP Reference Point from Course C1_RBreast   Elapsed Course Days  @    Session Dose 200 cGy   Total Dose 400 cGy              SUBJECTIVE:   Patient is doing well.  She does not have any changes related to her radiation.    VITAL SIGNS:    Encounter Vitals  Temperature: 36.6 °C (97.9 °F)  Blood Pressure : 150/79  Pulse:  84  Pulse Oximetry: 95 %  Pain Score: No pain  Pain Assessment 5/5/2021 4/8/2021   Pain Score 0 0   Some recent data might be hidden          PHYSICAL EXAM:  No erythema    TOXICITY  Toxicity Assessment 5/5/2021   Toxicity Assessment Breast   Fatigue (lethargy, malaise, asthenia) None   Fever (in the absence of neutropenia) None   Radiation Dermatitis None   Lymphatics Normal   RT - Pain due to RT None   Dyspnea Normal         IMPRESSION:  Cancer Staging  Carcinoma of upper-inner quadrant of right breast in female, estrogen receptor positive (HCC)  Staging form: Breast, AJCC 8th Edition  - Clinical: No stage assigned - Unsigned  - Pathologic: Stage IB (pT2, pN1a, cM0, G2, ER+, UT+, HER2-) - Signed by Vaibhav Mccullough M.D. on 4/7/2021      PLAN:  No change in treatment plan    Disposition:  Treatment plan reviewed. Questions answered. Continue therapy outlined.     Vaibhav Mccullough M.D.    No orders of the defined types were placed in this encounter.

## 2021-05-06 ENCOUNTER — HOSPITAL ENCOUNTER (OUTPATIENT)
Dept: RADIATION ONCOLOGY | Facility: MEDICAL CENTER | Age: 66
End: 2021-05-06
Payer: MEDICARE

## 2021-05-06 LAB
CHEMOTHERAPY INFUSION START DATE: NORMAL
CHEMOTHERAPY RECORDS: 2
CHEMOTHERAPY RECORDS: 2
CHEMOTHERAPY RECORDS: 5000
CHEMOTHERAPY RECORDS: 5000
CHEMOTHERAPY RECORDS: NORMAL
CHEMOTHERAPY RX CANCER: NORMAL
DATE 1ST CHEMO CANCER: NORMAL
RAD ONC ARIA COURSE LAST TREATMENT DATE: NORMAL
RAD ONC ARIA COURSE TREATMENT ELAPSED DAYS: NORMAL
RAD ONC ARIA REFERENCE POINT DOSAGE GIVEN TO DATE: 6
RAD ONC ARIA REFERENCE POINT ID: NORMAL
RAD ONC ARIA REFERENCE POINT SESSION DOSAGE GIVEN: 2

## 2021-05-06 PROCEDURE — 77336 RADIATION PHYSICS CONSULT: CPT | Performed by: RADIOLOGY

## 2021-05-06 PROCEDURE — 77412 RADIATION TX DELIVERY LVL 3: CPT | Performed by: RADIOLOGY

## 2021-05-07 ENCOUNTER — HOSPITAL ENCOUNTER (OUTPATIENT)
Dept: RADIATION ONCOLOGY | Facility: MEDICAL CENTER | Age: 66
End: 2021-05-07
Payer: MEDICARE

## 2021-05-07 LAB
CHEMOTHERAPY INFUSION START DATE: NORMAL
CHEMOTHERAPY RECORDS: 2
CHEMOTHERAPY RECORDS: 2
CHEMOTHERAPY RECORDS: 5000
CHEMOTHERAPY RECORDS: 5000
CHEMOTHERAPY RECORDS: NORMAL
CHEMOTHERAPY RX CANCER: NORMAL
DATE 1ST CHEMO CANCER: NORMAL
RAD ONC ARIA COURSE LAST TREATMENT DATE: NORMAL
RAD ONC ARIA COURSE TREATMENT ELAPSED DAYS: NORMAL
RAD ONC ARIA REFERENCE POINT DOSAGE GIVEN TO DATE: 8
RAD ONC ARIA REFERENCE POINT ID: NORMAL
RAD ONC ARIA REFERENCE POINT SESSION DOSAGE GIVEN: 2

## 2021-05-07 PROCEDURE — 77417 THER RADIOLOGY PORT IMAGE(S): CPT | Performed by: RADIOLOGY

## 2021-05-07 PROCEDURE — 77412 RADIATION TX DELIVERY LVL 3: CPT | Performed by: RADIOLOGY

## 2021-05-10 ENCOUNTER — HOSPITAL ENCOUNTER (OUTPATIENT)
Dept: RADIATION ONCOLOGY | Facility: MEDICAL CENTER | Age: 66
End: 2021-05-10
Payer: MEDICARE

## 2021-05-10 LAB
CHEMOTHERAPY INFUSION START DATE: NORMAL
CHEMOTHERAPY RECORDS: 2
CHEMOTHERAPY RECORDS: 2
CHEMOTHERAPY RECORDS: 5000
CHEMOTHERAPY RECORDS: 5000
CHEMOTHERAPY RECORDS: NORMAL
CHEMOTHERAPY RX CANCER: NORMAL
DATE 1ST CHEMO CANCER: NORMAL
RAD ONC ARIA COURSE LAST TREATMENT DATE: NORMAL
RAD ONC ARIA COURSE TREATMENT ELAPSED DAYS: NORMAL
RAD ONC ARIA REFERENCE POINT DOSAGE GIVEN TO DATE: 10
RAD ONC ARIA REFERENCE POINT ID: NORMAL
RAD ONC ARIA REFERENCE POINT SESSION DOSAGE GIVEN: 2

## 2021-05-10 PROCEDURE — 77412 RADIATION TX DELIVERY LVL 3: CPT | Performed by: RADIOLOGY

## 2021-05-10 PROCEDURE — 77427 RADIATION TX MANAGEMENT X5: CPT | Performed by: RADIOLOGY

## 2021-05-11 ENCOUNTER — HOSPITAL ENCOUNTER (OUTPATIENT)
Dept: RADIATION ONCOLOGY | Facility: MEDICAL CENTER | Age: 66
End: 2021-05-11
Payer: MEDICARE

## 2021-05-11 LAB
CHEMOTHERAPY INFUSION START DATE: NORMAL
CHEMOTHERAPY RECORDS: 2
CHEMOTHERAPY RECORDS: 2
CHEMOTHERAPY RECORDS: 5000
CHEMOTHERAPY RECORDS: 5000
CHEMOTHERAPY RECORDS: NORMAL
CHEMOTHERAPY RX CANCER: NORMAL
DATE 1ST CHEMO CANCER: NORMAL
RAD ONC ARIA COURSE LAST TREATMENT DATE: NORMAL
RAD ONC ARIA COURSE TREATMENT ELAPSED DAYS: NORMAL
RAD ONC ARIA REFERENCE POINT DOSAGE GIVEN TO DATE: 12
RAD ONC ARIA REFERENCE POINT ID: NORMAL
RAD ONC ARIA REFERENCE POINT SESSION DOSAGE GIVEN: 2

## 2021-05-11 PROCEDURE — 77417 THER RADIOLOGY PORT IMAGE(S): CPT | Performed by: RADIOLOGY

## 2021-05-11 PROCEDURE — 77412 RADIATION TX DELIVERY LVL 3: CPT | Performed by: RADIOLOGY

## 2021-05-12 ENCOUNTER — HOSPITAL ENCOUNTER (OUTPATIENT)
Dept: RADIATION ONCOLOGY | Facility: MEDICAL CENTER | Age: 66
End: 2021-05-12
Payer: MEDICARE

## 2021-05-12 VITALS
HEART RATE: 82 BPM | SYSTOLIC BLOOD PRESSURE: 161 MMHG | OXYGEN SATURATION: 97 % | DIASTOLIC BLOOD PRESSURE: 90 MMHG | TEMPERATURE: 97.7 F

## 2021-05-12 LAB
CHEMOTHERAPY INFUSION START DATE: NORMAL
CHEMOTHERAPY RECORDS: 2
CHEMOTHERAPY RECORDS: 2
CHEMOTHERAPY RECORDS: 5000
CHEMOTHERAPY RECORDS: 5000
CHEMOTHERAPY RECORDS: NORMAL
CHEMOTHERAPY RX CANCER: NORMAL
DATE 1ST CHEMO CANCER: NORMAL
RAD ONC ARIA COURSE LAST TREATMENT DATE: NORMAL
RAD ONC ARIA COURSE TREATMENT ELAPSED DAYS: NORMAL
RAD ONC ARIA REFERENCE POINT DOSAGE GIVEN TO DATE: 14
RAD ONC ARIA REFERENCE POINT ID: NORMAL
RAD ONC ARIA REFERENCE POINT SESSION DOSAGE GIVEN: 2

## 2021-05-12 PROCEDURE — 77412 RADIATION TX DELIVERY LVL 3: CPT | Performed by: RADIOLOGY

## 2021-05-12 ASSESSMENT — PAIN SCALES - GENERAL: PAINLEVEL: NO PAIN

## 2021-05-12 NOTE — ON TREATMENT VISIT
ON TREATMENT NOTE  RADIATION ONCOLOGY DEPARTMENT    Patient name:  Nain Frenchapitayamongkol    Primary Physician:  WALT Walsh MRN: [id number]  [facility]: [id number]   Referring physician:  Johnna Bailon M.D. : 1955, 66 y.o.     ENCOUNTER DATE:  21    DIAGNOSIS:    Carcinoma of upper-inner quadrant of right breast in female, estrogen receptor positive (HCC)  Staging form: Breast, AJCC 8th Edition  - Pathologic: Stage IB (pT2, pN1a, cM0, G2, ER+, FL+, HER2-) - Signed by Vaibhav Mccullough M.D. on 2021  Histologic grading system: 3 grade system      TREATMENT SUMMARY:  Aria Treatment Information        Some values may be hidden. Unless noted otherwise, only the newest values recorded on each date are displayed.         Aria Treatment Summary 21   Course First Treatment Date 2021   Course Last Treatment Date 2021   Rt Breast Plan from Course C1_RBreast   Fraction 7 of 25   Elapsed Course Days 8 @ [id number]   Prescribed Fraction Dose 200 cGy   Prescribed Total Dose 5,000 cGy   Rt SCV Plan from Course C1_RBreast   Fraction 7 of    Elapsed Course Days  @ [id number]   Prescribed Fraction Dose 200 cGy   Prescribed Total Dose 5,000 cGy   Rt Breast Reference Point from Course C1_RBreast   Elapsed Course Days  @ [id number]   Session Dose 200 cGy   Total Dose 1,400 cGy   Rt Breast CP Reference Point from Course C1_RBreast   Elapsed Course Days  @ [id number]   Session Dose 200 cGy   Total Dose 1,400 cGy   Rt SCV Reference Point from Course C1_RBreast   Elapsed Course Days  @ [id number]   Session Dose 200 cGy   Total Dose 1,400 cGy   Rt SCV CP Reference Point from Course C1_RBreast   Elapsed Course Days  @ [id number]   Session Dose 200 cGy   Total Dose 1,400 cGy              SUBJECTIVE:   Patient is doing well.  She does not have any changes she would attribute to her radiation.    VITAL SIGNS:    Encounter Vitals  Temperature: 36.5 °C (97.7 °F)  Blood  Pressure : (!) 161/90  Pulse: 82  Pulse Oximetry: 97 %  Pain Score: No pain  Pain Assessment 5/12/2021 5/5/2021 4/8/2021   Pain Score 0 0 0   Some recent data might be hidden          PHYSICAL EXAM:  No erythema    TOXICITY  Toxicity Assessment 5/12/2021 5/5/2021   Toxicity Assessment Breast Breast   Fatigue (lethargy, malaise, asthenia) Increased fatigue over baseline, but not altering normal activities None   Fever (in the absence of neutropenia) None None   Radiation Dermatitis None None   Lymphatics Normal Normal   RT - Pain due to RT None None   Dyspnea Normal Normal         IMPRESSION:  Cancer Staging  Carcinoma of upper-inner quadrant of right breast in female, estrogen receptor positive (HCC)  Staging form: Breast, AJCC 8th Edition  - Clinical: No stage assigned - Unsigned  - Pathologic: Stage IB (pT2, pN1a, cM0, G2, ER+, IA+, HER2-) - Signed by Vaibhav Mccullough M.D. on 4/7/2021      PLAN:  No change in treatment plan    Disposition:  Treatment plan reviewed. Questions answered. Continue therapy outlined.     Vaibhav Mccullough M.D.    No orders of the defined types were placed in this encounter.

## 2021-05-13 ENCOUNTER — HOSPITAL ENCOUNTER (OUTPATIENT)
Dept: RADIATION ONCOLOGY | Facility: MEDICAL CENTER | Age: 66
End: 2021-05-13
Payer: MEDICARE

## 2021-05-13 LAB
CHEMOTHERAPY INFUSION START DATE: NORMAL
CHEMOTHERAPY RECORDS: 2
CHEMOTHERAPY RECORDS: 2
CHEMOTHERAPY RECORDS: 5000
CHEMOTHERAPY RECORDS: 5000
CHEMOTHERAPY RECORDS: NORMAL
CHEMOTHERAPY RX CANCER: NORMAL
DATE 1ST CHEMO CANCER: NORMAL
RAD ONC ARIA COURSE LAST TREATMENT DATE: NORMAL
RAD ONC ARIA COURSE TREATMENT ELAPSED DAYS: NORMAL
RAD ONC ARIA REFERENCE POINT DOSAGE GIVEN TO DATE: 16
RAD ONC ARIA REFERENCE POINT ID: NORMAL
RAD ONC ARIA REFERENCE POINT SESSION DOSAGE GIVEN: 2

## 2021-05-13 PROCEDURE — 77336 RADIATION PHYSICS CONSULT: CPT | Performed by: RADIOLOGY

## 2021-05-13 PROCEDURE — 77412 RADIATION TX DELIVERY LVL 3: CPT | Performed by: RADIOLOGY

## 2021-05-14 ENCOUNTER — HOSPITAL ENCOUNTER (OUTPATIENT)
Dept: RADIATION ONCOLOGY | Facility: MEDICAL CENTER | Age: 66
End: 2021-05-14
Payer: MEDICARE

## 2021-05-14 LAB
CHEMOTHERAPY INFUSION START DATE: NORMAL
CHEMOTHERAPY RECORDS: 2
CHEMOTHERAPY RECORDS: 2
CHEMOTHERAPY RECORDS: 5000
CHEMOTHERAPY RECORDS: 5000
CHEMOTHERAPY RECORDS: NORMAL
CHEMOTHERAPY RX CANCER: NORMAL
DATE 1ST CHEMO CANCER: NORMAL
RAD ONC ARIA COURSE LAST TREATMENT DATE: NORMAL
RAD ONC ARIA COURSE TREATMENT ELAPSED DAYS: NORMAL
RAD ONC ARIA REFERENCE POINT DOSAGE GIVEN TO DATE: 18
RAD ONC ARIA REFERENCE POINT ID: NORMAL
RAD ONC ARIA REFERENCE POINT SESSION DOSAGE GIVEN: 2

## 2021-05-14 PROCEDURE — 77412 RADIATION TX DELIVERY LVL 3: CPT | Performed by: RADIOLOGY

## 2021-05-17 ENCOUNTER — HOSPITAL ENCOUNTER (OUTPATIENT)
Dept: RADIATION ONCOLOGY | Facility: MEDICAL CENTER | Age: 66
End: 2021-05-17
Payer: MEDICARE

## 2021-05-17 LAB
CHEMOTHERAPY INFUSION START DATE: NORMAL
CHEMOTHERAPY RECORDS: 2
CHEMOTHERAPY RECORDS: 2
CHEMOTHERAPY RECORDS: 5000
CHEMOTHERAPY RECORDS: 5000
CHEMOTHERAPY RECORDS: NORMAL
CHEMOTHERAPY RX CANCER: NORMAL
DATE 1ST CHEMO CANCER: NORMAL
RAD ONC ARIA COURSE LAST TREATMENT DATE: NORMAL
RAD ONC ARIA COURSE TREATMENT ELAPSED DAYS: NORMAL
RAD ONC ARIA REFERENCE POINT DOSAGE GIVEN TO DATE: 20
RAD ONC ARIA REFERENCE POINT ID: NORMAL
RAD ONC ARIA REFERENCE POINT SESSION DOSAGE GIVEN: 2

## 2021-05-17 PROCEDURE — 77427 RADIATION TX MANAGEMENT X5: CPT | Performed by: RADIOLOGY

## 2021-05-17 PROCEDURE — 77412 RADIATION TX DELIVERY LVL 3: CPT | Performed by: RADIOLOGY

## 2021-05-18 ENCOUNTER — HOSPITAL ENCOUNTER (OUTPATIENT)
Dept: RADIATION ONCOLOGY | Facility: MEDICAL CENTER | Age: 66
End: 2021-05-18
Payer: MEDICARE

## 2021-05-18 LAB
CHEMOTHERAPY INFUSION START DATE: NORMAL
CHEMOTHERAPY RECORDS: 2
CHEMOTHERAPY RECORDS: 2
CHEMOTHERAPY RECORDS: 5000
CHEMOTHERAPY RECORDS: 5000
CHEMOTHERAPY RECORDS: NORMAL
CHEMOTHERAPY RX CANCER: NORMAL
DATE 1ST CHEMO CANCER: NORMAL
RAD ONC ARIA COURSE LAST TREATMENT DATE: NORMAL
RAD ONC ARIA COURSE TREATMENT ELAPSED DAYS: NORMAL
RAD ONC ARIA REFERENCE POINT DOSAGE GIVEN TO DATE: 22
RAD ONC ARIA REFERENCE POINT ID: NORMAL
RAD ONC ARIA REFERENCE POINT SESSION DOSAGE GIVEN: 2

## 2021-05-18 PROCEDURE — 77412 RADIATION TX DELIVERY LVL 3: CPT | Performed by: RADIOLOGY

## 2021-05-18 PROCEDURE — 77417 THER RADIOLOGY PORT IMAGE(S): CPT | Performed by: RADIOLOGY

## 2021-05-19 ENCOUNTER — HOSPITAL ENCOUNTER (OUTPATIENT)
Dept: RADIATION ONCOLOGY | Facility: MEDICAL CENTER | Age: 66
End: 2021-05-19
Payer: MEDICARE

## 2021-05-19 VITALS
DIASTOLIC BLOOD PRESSURE: 83 MMHG | HEART RATE: 76 BPM | TEMPERATURE: 97.9 F | SYSTOLIC BLOOD PRESSURE: 140 MMHG | OXYGEN SATURATION: 97 %

## 2021-05-19 LAB
CHEMOTHERAPY INFUSION START DATE: NORMAL
CHEMOTHERAPY RECORDS: 2
CHEMOTHERAPY RECORDS: 2
CHEMOTHERAPY RECORDS: 5000
CHEMOTHERAPY RECORDS: 5000
CHEMOTHERAPY RECORDS: NORMAL
CHEMOTHERAPY RX CANCER: NORMAL
DATE 1ST CHEMO CANCER: NORMAL
RAD ONC ARIA COURSE LAST TREATMENT DATE: NORMAL
RAD ONC ARIA COURSE TREATMENT ELAPSED DAYS: NORMAL
RAD ONC ARIA REFERENCE POINT DOSAGE GIVEN TO DATE: 24
RAD ONC ARIA REFERENCE POINT ID: NORMAL
RAD ONC ARIA REFERENCE POINT SESSION DOSAGE GIVEN: 2

## 2021-05-19 PROCEDURE — 77412 RADIATION TX DELIVERY LVL 3: CPT | Performed by: RADIOLOGY

## 2021-05-19 RX ORDER — ANASTROZOLE 1 MG/1
1 TABLET ORAL
COMMUNITY
Start: 2021-04-27

## 2021-05-19 RX ORDER — ERGOCALCIFEROL 1.25 MG/1
CAPSULE ORAL
COMMUNITY
Start: 2021-04-14

## 2021-05-19 ASSESSMENT — PAIN SCALES - GENERAL: PAINLEVEL: NO PAIN

## 2021-05-19 NOTE — ON TREATMENT VISIT
ON TREATMENT NOTE  RADIATION ONCOLOGY DEPARTMENT    Patient name:  Nain Frenchapitayamongkol    Primary Physician:  WALT Walsh MRN: [id number]  [facility]: [id number]   Referring physician:  Johnna Bailon M.D. : 1955, 66 y.o.     ENCOUNTER DATE:  21    DIAGNOSIS:    Carcinoma of upper-inner quadrant of right breast in female, estrogen receptor positive (HCC)  Staging form: Breast, AJCC 8th Edition  - Pathologic: Stage IB (pT2, pN1a, cM0, G2, ER+, KS+, HER2-) - Signed by Vaibhav Mccullough M.D. on 2021  Histologic grading system: 3 grade system      TREATMENT SUMMARY:  Aria Treatment Information        Some values may be hidden. Unless noted otherwise, only the newest values recorded on each date are displayed.         Aria Treatment Summary 21   Course First Treatment Date 2021   Course Last Treatment Date 2021   Rt Breast Plan from Course C1_RBreast   Fraction    Elapsed Course Days 15 @ [id number]   Prescribed Fraction Dose 200 cGy   Prescribed Total Dose 5,000 cGy   Rt SCV Plan from Course C1_RBreast   Fraction    Elapsed Course Days 15 @ [id number]   Prescribed Fraction Dose 200 cGy   Prescribed Total Dose 5,000 cGy   Rt Breast Reference Point from Course C1_RBreast   Elapsed Course Days 15 @ [id number]   Session Dose 200 cGy   Total Dose 2,400 cGy   Rt Breast CP Reference Point from Course C1_RBreast   Elapsed Course Days 15 @ [id number]   Session Dose 200 cGy   Total Dose 2,400 cGy   Rt SCV Reference Point from Course C1_RBreast   Elapsed Course Days 15 @ [id number]   Session Dose 200 cGy   Total Dose 2,400 cGy   Rt SCV CP Reference Point from Course C1_RBreast   Elapsed Course Days 15 @ [id number]   Session Dose 200 cGy   Total Dose 2,400 cGy              SUBJECTIVE:   Patient is doing well.  She is only experiencing faint fatigue.    VITAL SIGNS:    Encounter Vitals  Temperature: 36.6 °C (97.9 °F)  Blood Pressure : 140/83  Pulse:  76  Pulse Oximetry: 97 %  Pain Score: No pain  Pain Assessment 5/19/2021 5/12/2021 5/5/2021 4/8/2021   Pain Score 0 0 0 0   Some recent data might be hidden          PHYSICAL EXAM:  No erythema    TOXICITY  Toxicity Assessment 5/19/2021 5/12/2021 5/5/2021   Toxicity Assessment Breast Breast Breast   Fatigue (lethargy, malaise, asthenia) Increased fatigue over baseline, but not altering normal activities Increased fatigue over baseline, but not altering normal activities None   Fever (in the absence of neutropenia) None None None   Radiation Dermatitis None None None   Lymphatics Normal Normal Normal   RT - Pain due to RT None None None   Dyspnea Normal Normal Normal         IMPRESSION:  Cancer Staging  Carcinoma of upper-inner quadrant of right breast in female, estrogen receptor positive (HCC)  Staging form: Breast, AJCC 8th Edition  - Clinical: No stage assigned - Unsigned  - Pathologic: Stage IB (pT2, pN1a, cM0, G2, ER+, DC+, HER2-) - Signed by Vaibhav Mccullough M.D. on 4/7/2021      PLAN:  No change in treatment plan    Disposition:  Treatment plan reviewed. Questions answered. Continue therapy outlined.     Vaibhav Mccullough M.D.    Orders Placed This Encounter   • anastrozole (ARIMIDEX) 1 MG Tab   • vitamin D, Ergocalciferol, (DRISDOL) 1.25 MG (68441 UT) Cap capsule

## 2021-05-20 ENCOUNTER — HOSPITAL ENCOUNTER (OUTPATIENT)
Dept: RADIATION ONCOLOGY | Facility: MEDICAL CENTER | Age: 66
End: 2021-05-20
Payer: MEDICARE

## 2021-05-20 LAB
CHEMOTHERAPY INFUSION START DATE: NORMAL
CHEMOTHERAPY RECORDS: 2
CHEMOTHERAPY RECORDS: 2
CHEMOTHERAPY RECORDS: 5000
CHEMOTHERAPY RECORDS: 5000
CHEMOTHERAPY RECORDS: NORMAL
CHEMOTHERAPY RX CANCER: NORMAL
DATE 1ST CHEMO CANCER: NORMAL
RAD ONC ARIA COURSE LAST TREATMENT DATE: NORMAL
RAD ONC ARIA COURSE TREATMENT ELAPSED DAYS: NORMAL
RAD ONC ARIA REFERENCE POINT DOSAGE GIVEN TO DATE: 26
RAD ONC ARIA REFERENCE POINT ID: NORMAL
RAD ONC ARIA REFERENCE POINT SESSION DOSAGE GIVEN: 2

## 2021-05-20 PROCEDURE — 77336 RADIATION PHYSICS CONSULT: CPT | Performed by: RADIOLOGY

## 2021-05-20 PROCEDURE — 77412 RADIATION TX DELIVERY LVL 3: CPT | Performed by: RADIOLOGY

## 2021-05-21 ENCOUNTER — HOSPITAL ENCOUNTER (OUTPATIENT)
Dept: RADIATION ONCOLOGY | Facility: MEDICAL CENTER | Age: 66
End: 2021-05-21
Payer: MEDICARE

## 2021-05-21 LAB
CHEMOTHERAPY INFUSION START DATE: NORMAL
CHEMOTHERAPY RECORDS: 2
CHEMOTHERAPY RECORDS: 2
CHEMOTHERAPY RECORDS: 5000
CHEMOTHERAPY RECORDS: 5000
CHEMOTHERAPY RECORDS: NORMAL
CHEMOTHERAPY RX CANCER: NORMAL
DATE 1ST CHEMO CANCER: NORMAL
RAD ONC ARIA COURSE LAST TREATMENT DATE: NORMAL
RAD ONC ARIA COURSE TREATMENT ELAPSED DAYS: NORMAL
RAD ONC ARIA REFERENCE POINT DOSAGE GIVEN TO DATE: 28
RAD ONC ARIA REFERENCE POINT ID: NORMAL
RAD ONC ARIA REFERENCE POINT SESSION DOSAGE GIVEN: 2

## 2021-05-21 PROCEDURE — 77412 RADIATION TX DELIVERY LVL 3: CPT | Performed by: RADIOLOGY

## 2021-05-24 ENCOUNTER — HOSPITAL ENCOUNTER (OUTPATIENT)
Dept: RADIATION ONCOLOGY | Facility: MEDICAL CENTER | Age: 66
End: 2021-05-24
Payer: MEDICARE

## 2021-05-24 LAB
CHEMOTHERAPY INFUSION START DATE: NORMAL
CHEMOTHERAPY RECORDS: 2
CHEMOTHERAPY RECORDS: 2
CHEMOTHERAPY RECORDS: 5000
CHEMOTHERAPY RECORDS: 5000
CHEMOTHERAPY RECORDS: NORMAL
CHEMOTHERAPY RX CANCER: NORMAL
DATE 1ST CHEMO CANCER: NORMAL
RAD ONC ARIA COURSE LAST TREATMENT DATE: NORMAL
RAD ONC ARIA COURSE TREATMENT ELAPSED DAYS: NORMAL
RAD ONC ARIA REFERENCE POINT DOSAGE GIVEN TO DATE: 30
RAD ONC ARIA REFERENCE POINT ID: NORMAL
RAD ONC ARIA REFERENCE POINT SESSION DOSAGE GIVEN: 2

## 2021-05-24 PROCEDURE — 77412 RADIATION TX DELIVERY LVL 3: CPT | Performed by: RADIOLOGY

## 2021-05-24 PROCEDURE — 77427 RADIATION TX MANAGEMENT X5: CPT | Performed by: RADIOLOGY

## 2021-05-25 ENCOUNTER — HOSPITAL ENCOUNTER (OUTPATIENT)
Dept: RADIATION ONCOLOGY | Facility: MEDICAL CENTER | Age: 66
End: 2021-05-25
Payer: MEDICARE

## 2021-05-25 ENCOUNTER — PATIENT OUTREACH (OUTPATIENT)
Dept: OTHER | Facility: MEDICAL CENTER | Age: 66
End: 2021-05-25

## 2021-05-25 LAB
CHEMOTHERAPY INFUSION START DATE: NORMAL
CHEMOTHERAPY RECORDS: 2
CHEMOTHERAPY RECORDS: 2
CHEMOTHERAPY RECORDS: 5000
CHEMOTHERAPY RECORDS: 5000
CHEMOTHERAPY RECORDS: NORMAL
CHEMOTHERAPY RX CANCER: NORMAL
DATE 1ST CHEMO CANCER: NORMAL
RAD ONC ARIA COURSE LAST TREATMENT DATE: NORMAL
RAD ONC ARIA COURSE TREATMENT ELAPSED DAYS: NORMAL
RAD ONC ARIA REFERENCE POINT DOSAGE GIVEN TO DATE: 32
RAD ONC ARIA REFERENCE POINT ID: NORMAL
RAD ONC ARIA REFERENCE POINT SESSION DOSAGE GIVEN: 2

## 2021-05-25 PROCEDURE — 77417 THER RADIOLOGY PORT IMAGE(S): CPT | Performed by: RADIOLOGY

## 2021-05-25 PROCEDURE — 77412 RADIATION TX DELIVERY LVL 3: CPT | Performed by: RADIOLOGY

## 2021-05-25 NOTE — PROGRESS NOTES
On May 25, 2021, Oncology Social Worker Jeanette Richardson and Oncology Nurse Navigator Raji Pratt attempted telephone contact with pt.'s daughter.  OSW Dylan left voicemail message for pt. requesting pt. call back at earliest convenience.  OSW Dylan left contact information in voicemail message.

## 2021-05-26 ENCOUNTER — HOSPITAL ENCOUNTER (OUTPATIENT)
Dept: RADIATION ONCOLOGY | Facility: MEDICAL CENTER | Age: 66
End: 2021-05-26
Payer: MEDICARE

## 2021-05-26 VITALS
HEART RATE: 66 BPM | TEMPERATURE: 97.1 F | DIASTOLIC BLOOD PRESSURE: 87 MMHG | OXYGEN SATURATION: 96 % | SYSTOLIC BLOOD PRESSURE: 160 MMHG

## 2021-05-26 LAB
CHEMOTHERAPY INFUSION START DATE: NORMAL
CHEMOTHERAPY RECORDS: 2
CHEMOTHERAPY RECORDS: 2
CHEMOTHERAPY RECORDS: 5000
CHEMOTHERAPY RECORDS: 5000
CHEMOTHERAPY RECORDS: NORMAL
CHEMOTHERAPY RX CANCER: NORMAL
DATE 1ST CHEMO CANCER: NORMAL
RAD ONC ARIA COURSE LAST TREATMENT DATE: NORMAL
RAD ONC ARIA COURSE TREATMENT ELAPSED DAYS: NORMAL
RAD ONC ARIA REFERENCE POINT DOSAGE GIVEN TO DATE: 34
RAD ONC ARIA REFERENCE POINT ID: NORMAL
RAD ONC ARIA REFERENCE POINT SESSION DOSAGE GIVEN: 2

## 2021-05-26 PROCEDURE — 77412 RADIATION TX DELIVERY LVL 3: CPT | Performed by: RADIOLOGY

## 2021-05-26 ASSESSMENT — PAIN SCALES - GENERAL: PAINLEVEL: NO PAIN

## 2021-05-26 NOTE — ON TREATMENT VISIT
ON TREATMENT NOTE  RADIATION ONCOLOGY DEPARTMENT    Patient name:  Nain Frenchapitayamongkol    Primary Physician:  WALT Walsh MRN: [id number]  CSN: [id number]   Referring physician:  Johnna Bailon M.D. : 1955, 66 y.o.     ENCOUNTER DATE:  21    DIAGNOSIS:    Carcinoma of upper-inner quadrant of right breast in female, estrogen receptor positive (HCC)  Staging form: Breast, AJCC 8th Edition  - Pathologic: Stage IB (pT2, pN1a, cM0, G2, ER+, ME+, HER2-) - Signed by Vaibhav Mccullough M.D. on 2021  Histologic grading system: 3 grade system      TREATMENT SUMMARY:  Aria Treatment Information        Some values may be hidden. Unless noted otherwise, only the newest values recorded on each date are displayed.         Aria Treatment Summary 21   Course First Treatment Date 2021   Course Last Treatment Date 2021   Rt Breast Plan from Course C1_RBreast   Fraction 17 of 25   Elapsed Course Days  @ [id number]   Prescribed Fraction Dose 200 cGy   Prescribed Total Dose 5,000 cGy   Rt SCV Plan from Course C1_RBreast   Fraction 17 of    Elapsed Course Days  @ [id number]   Prescribed Fraction Dose 200 cGy   Prescribed Total Dose 5,000 cGy   Rt Breast Reference Point from Course C1_RBreast   Elapsed Course Days  @ [id number]   Session Dose 200 cGy   Total Dose 3,400 cGy   Rt Breast CP Reference Point from Course C1_RBreast   Elapsed Course Days [id number]   Session Dose 200 cGy   Total Dose 3,400 cGy   Rt SCV Reference Point from Course C1_RBreast   Elapsed Course Days  @ [id number]   Session Dose 200 cGy   Total Dose 3,400 cGy   Rt SCV CP Reference Point from Course C1_RBreast   Elapsed Course Days  @ [id number]   Session Dose 200 cGy   Total Dose 3,400 cGy              SUBJECTIVE:   Patient is doing well.  Her only complaint is of slight hyperpigmentation in the mid axillary line.    VITAL SIGNS:    Encounter Vitals  Temperature: 36.2 °C (97.1  °F)  Blood Pressure : 160/87  Pulse: 66  Pulse Oximetry: 96 %  Pain Score: No pain  Pain Assessment 5/26/2021 5/19/2021 5/12/2021 5/5/2021 4/8/2021   Pain Score 0 0 0 0 0   Some recent data might be hidden          PHYSICAL EXAM:  Erythema in the mid axillary line    TOXICITY  Toxicity Assessment 5/26/2021 5/19/2021 5/12/2021 5/5/2021   Toxicity Assessment Breast Breast Breast Breast   Fatigue (lethargy, malaise, asthenia) Increased fatigue over baseline, but not altering normal activities Increased fatigue over baseline, but not altering normal activities Increased fatigue over baseline, but not altering normal activities None   Fever (in the absence of neutropenia) None None None None   Radiation Dermatitis Faint erythema or dry desquamation None None None   Lymphatics Normal Normal Normal Normal   RT - Pain due to RT None None None None   Dyspnea Normal Normal Normal Normal         IMPRESSION:  Cancer Staging  Carcinoma of upper-inner quadrant of right breast in female, estrogen receptor positive (HCC)  Staging form: Breast, AJCC 8th Edition  - Clinical: No stage assigned - Unsigned  - Pathologic: Stage IB (pT2, pN1a, cM0, G2, ER+, TN+, HER2-) - Signed by Vaibhav Mccullough M.D. on 4/7/2021      PLAN:  No change in treatment plan    Disposition:  Treatment plan reviewed. Questions answered. Continue therapy outlined.     Vaibhav Mccullough M.D.    No orders of the defined types were placed in this encounter.

## 2021-05-27 ENCOUNTER — HOSPITAL ENCOUNTER (OUTPATIENT)
Dept: RADIATION ONCOLOGY | Facility: MEDICAL CENTER | Age: 66
End: 2021-05-27
Payer: MEDICARE

## 2021-05-27 LAB
CHEMOTHERAPY INFUSION START DATE: NORMAL
CHEMOTHERAPY RECORDS: 2
CHEMOTHERAPY RECORDS: 2
CHEMOTHERAPY RECORDS: 5000
CHEMOTHERAPY RECORDS: 5000
CHEMOTHERAPY RECORDS: NORMAL
CHEMOTHERAPY RX CANCER: NORMAL
DATE 1ST CHEMO CANCER: NORMAL
RAD ONC ARIA COURSE LAST TREATMENT DATE: NORMAL
RAD ONC ARIA COURSE TREATMENT ELAPSED DAYS: NORMAL
RAD ONC ARIA REFERENCE POINT DOSAGE GIVEN TO DATE: 36
RAD ONC ARIA REFERENCE POINT ID: NORMAL
RAD ONC ARIA REFERENCE POINT SESSION DOSAGE GIVEN: 2

## 2021-05-27 PROCEDURE — 77412 RADIATION TX DELIVERY LVL 3: CPT | Performed by: RADIOLOGY

## 2021-05-27 PROCEDURE — 77336 RADIATION PHYSICS CONSULT: CPT | Performed by: RADIOLOGY

## 2021-05-27 NOTE — PROGRESS NOTES
Oncology Nurse Navigator and Oncology Social Worker Jeanette Richardson attemtped to phone pt's daughter.  No answer, lvm.

## 2021-05-28 ENCOUNTER — HOSPITAL ENCOUNTER (OUTPATIENT)
Dept: RADIATION ONCOLOGY | Facility: MEDICAL CENTER | Age: 66
End: 2021-05-28
Payer: MEDICARE

## 2021-05-28 LAB
CHEMOTHERAPY INFUSION START DATE: NORMAL
CHEMOTHERAPY RECORDS: 2
CHEMOTHERAPY RECORDS: 2
CHEMOTHERAPY RECORDS: 5000
CHEMOTHERAPY RECORDS: 5000
CHEMOTHERAPY RECORDS: NORMAL
CHEMOTHERAPY RX CANCER: NORMAL
DATE 1ST CHEMO CANCER: NORMAL
RAD ONC ARIA COURSE LAST TREATMENT DATE: NORMAL
RAD ONC ARIA COURSE TREATMENT ELAPSED DAYS: NORMAL
RAD ONC ARIA REFERENCE POINT DOSAGE GIVEN TO DATE: 38
RAD ONC ARIA REFERENCE POINT ID: NORMAL
RAD ONC ARIA REFERENCE POINT SESSION DOSAGE GIVEN: 2

## 2021-05-28 PROCEDURE — 77412 RADIATION TX DELIVERY LVL 3: CPT | Performed by: RADIOLOGY

## 2021-06-01 ENCOUNTER — HOSPITAL ENCOUNTER (OUTPATIENT)
Dept: RADIATION ONCOLOGY | Facility: MEDICAL CENTER | Age: 66
End: 2021-06-01
Payer: MEDICARE

## 2021-06-01 ENCOUNTER — HOSPITAL ENCOUNTER (OUTPATIENT)
Dept: RADIATION ONCOLOGY | Facility: MEDICAL CENTER | Age: 66
End: 2021-06-30
Attending: RADIOLOGY
Payer: COMMERCIAL

## 2021-06-01 LAB
CHEMOTHERAPY INFUSION START DATE: NORMAL
CHEMOTHERAPY RECORDS: 2
CHEMOTHERAPY RECORDS: 2
CHEMOTHERAPY RECORDS: 5000
CHEMOTHERAPY RECORDS: 5000
CHEMOTHERAPY RECORDS: NORMAL
CHEMOTHERAPY RX CANCER: NORMAL
DATE 1ST CHEMO CANCER: NORMAL
RAD ONC ARIA COURSE LAST TREATMENT DATE: NORMAL
RAD ONC ARIA COURSE TREATMENT ELAPSED DAYS: NORMAL
RAD ONC ARIA REFERENCE POINT DOSAGE GIVEN TO DATE: 40
RAD ONC ARIA REFERENCE POINT ID: NORMAL
RAD ONC ARIA REFERENCE POINT SESSION DOSAGE GIVEN: 2

## 2021-06-01 PROCEDURE — 77427 RADIATION TX MANAGEMENT X5: CPT | Performed by: RADIOLOGY

## 2021-06-01 PROCEDURE — 77412 RADIATION TX DELIVERY LVL 3: CPT | Performed by: RADIOLOGY

## 2021-06-02 ENCOUNTER — HOSPITAL ENCOUNTER (OUTPATIENT)
Dept: RADIATION ONCOLOGY | Facility: MEDICAL CENTER | Age: 66
End: 2021-06-02
Payer: MEDICARE

## 2021-06-02 LAB
CHEMOTHERAPY INFUSION START DATE: NORMAL
CHEMOTHERAPY RECORDS: 2
CHEMOTHERAPY RECORDS: 2
CHEMOTHERAPY RECORDS: 5000
CHEMOTHERAPY RECORDS: 5000
CHEMOTHERAPY RECORDS: NORMAL
CHEMOTHERAPY RX CANCER: NORMAL
DATE 1ST CHEMO CANCER: NORMAL
RAD ONC ARIA COURSE LAST TREATMENT DATE: NORMAL
RAD ONC ARIA COURSE TREATMENT ELAPSED DAYS: NORMAL
RAD ONC ARIA REFERENCE POINT DOSAGE GIVEN TO DATE: 42
RAD ONC ARIA REFERENCE POINT ID: NORMAL
RAD ONC ARIA REFERENCE POINT SESSION DOSAGE GIVEN: 2

## 2021-06-02 PROCEDURE — 77412 RADIATION TX DELIVERY LVL 3: CPT | Performed by: RADIOLOGY

## 2021-06-02 PROCEDURE — 77417 THER RADIOLOGY PORT IMAGE(S): CPT | Performed by: RADIOLOGY

## 2021-06-02 NOTE — ON TREATMENT VISIT
ON TREATMENT NOTE  RADIATION ONCOLOGY DEPARTMENT    Patient name:  Nain Frenchapitayamongkol    Primary Physician:  WALT Walsh MRN: [id number]  [facility]: [id number]   Referring physician:  Johnna Bailon M.D. : 1955, 66 y.o.     ENCOUNTER DATE:  21    DIAGNOSIS:    Carcinoma of upper-inner quadrant of right breast in female, estrogen receptor positive (HCC)  Staging form: Breast, AJCC 8th Edition  - Pathologic: Stage IB (pT2, pN1a, cM0, G2, ER+, AL+, HER2-) - Signed by Vaibhav Mccullough M.D. on 2021  Histologic grading system: 3 grade system      TREATMENT SUMMARY:  Aria Treatment Information        Some values may be hidden. Unless noted otherwise, only the newest values recorded on each date are displayed.         Aria Treatment Summary 21   Course First Treatment Date 2021   Course Last Treatment Date 2021   Rt Breast Plan from Course C1_RBreast   Fraction    Elapsed Course Days  @ [id number]   Prescribed Fraction Dose 200 cGy   Prescribed Total Dose 5,000 cGy   Rt SCV Plan from Course C1_RBreast   Fraction    Elapsed Course Days  @ [id number]   Prescribed Fraction Dose 200 cGy   Prescribed Total Dose 5,000 cGy   Rt Breast Reference Point from Course C1_RBreast   Elapsed Course Days  @ [id number]   Session Dose 200 cGy   Total Dose 4,200 cGy   Rt Breast CP Reference Point from Course C1_RBreast   Elapsed Course Days 29 @ [id number]   Session Dose 200 cGy   Total Dose 4,200 cGy   Rt SCV Reference Point from Course C1_RBreast   Elapsed Course Days  @ [id number]   Session Dose 200 cGy   Total Dose 4,200 cGy   Rt SCV CP Reference Point from Course C1_RBreast   Elapsed Course Days  @ [id number]   Session Dose 200 cGy   Total Dose 4,200 cGy              SUBJECTIVE:   Patient is doing well.  She is only experiencing itchiness and hyperpigmentation in the radiation treatment area.    VITAL SIGNS:       Pain Assessment 2021  5/19/2021 5/12/2021 5/5/2021 4/8/2021   Pain Score 0 0 0 0 0   Some recent data might be hidden          PHYSICAL EXAM:  Hyperpigmentation in the treatment field    TOXICITY  Toxicity Assessment 5/26/2021 5/19/2021 5/12/2021 5/5/2021   Toxicity Assessment Breast Breast Breast Breast   Fatigue (lethargy, malaise, asthenia) Increased fatigue over baseline, but not altering normal activities Increased fatigue over baseline, but not altering normal activities Increased fatigue over baseline, but not altering normal activities None   Fever (in the absence of neutropenia) None None None None   Radiation Dermatitis Faint erythema or dry desquamation None None None   Lymphatics Normal Normal Normal Normal   RT - Pain due to RT None None None None   Dyspnea Normal Normal Normal Normal         IMPRESSION:  Cancer Staging  Carcinoma of upper-inner quadrant of right breast in female, estrogen receptor positive (HCC)  Staging form: Breast, AJCC 8th Edition  - Clinical: No stage assigned - Unsigned  - Pathologic: Stage IB (pT2, pN1a, cM0, G2, ER+, DE+, HER2-) - Signed by Vaibhav Mccullough M.D. on 4/7/2021      PLAN:  No change in treatment plan    Disposition:  Treatment plan reviewed. Questions answered. Continue therapy outlined.     Vaibhav Mccullough M.D.    No orders of the defined types were placed in this encounter.

## 2021-06-03 ENCOUNTER — HOSPITAL ENCOUNTER (OUTPATIENT)
Dept: RADIATION ONCOLOGY | Facility: MEDICAL CENTER | Age: 66
End: 2021-06-03
Payer: MEDICARE

## 2021-06-03 LAB
CHEMOTHERAPY INFUSION START DATE: NORMAL
CHEMOTHERAPY RECORDS: 2
CHEMOTHERAPY RECORDS: 2
CHEMOTHERAPY RECORDS: 5000
CHEMOTHERAPY RECORDS: 5000
CHEMOTHERAPY RECORDS: NORMAL
CHEMOTHERAPY RX CANCER: NORMAL
DATE 1ST CHEMO CANCER: NORMAL
RAD ONC ARIA COURSE LAST TREATMENT DATE: NORMAL
RAD ONC ARIA COURSE TREATMENT ELAPSED DAYS: NORMAL
RAD ONC ARIA REFERENCE POINT DOSAGE GIVEN TO DATE: 44
RAD ONC ARIA REFERENCE POINT ID: NORMAL
RAD ONC ARIA REFERENCE POINT SESSION DOSAGE GIVEN: 2

## 2021-06-03 PROCEDURE — 77412 RADIATION TX DELIVERY LVL 3: CPT | Performed by: RADIOLOGY

## 2021-06-04 ENCOUNTER — HOSPITAL ENCOUNTER (OUTPATIENT)
Dept: RADIATION ONCOLOGY | Facility: MEDICAL CENTER | Age: 66
End: 2021-06-04
Payer: MEDICARE

## 2021-06-04 LAB
CHEMOTHERAPY INFUSION START DATE: NORMAL
CHEMOTHERAPY RECORDS: 2
CHEMOTHERAPY RECORDS: 2
CHEMOTHERAPY RECORDS: 5000
CHEMOTHERAPY RECORDS: 5000
CHEMOTHERAPY RECORDS: NORMAL
CHEMOTHERAPY RX CANCER: NORMAL
DATE 1ST CHEMO CANCER: NORMAL
RAD ONC ARIA COURSE LAST TREATMENT DATE: NORMAL
RAD ONC ARIA COURSE TREATMENT ELAPSED DAYS: NORMAL
RAD ONC ARIA REFERENCE POINT DOSAGE GIVEN TO DATE: 46
RAD ONC ARIA REFERENCE POINT ID: NORMAL
RAD ONC ARIA REFERENCE POINT SESSION DOSAGE GIVEN: 2

## 2021-06-04 PROCEDURE — 77336 RADIATION PHYSICS CONSULT: CPT | Performed by: RADIOLOGY

## 2021-06-04 PROCEDURE — 77412 RADIATION TX DELIVERY LVL 3: CPT | Performed by: RADIOLOGY

## 2021-06-07 ENCOUNTER — HOSPITAL ENCOUNTER (OUTPATIENT)
Dept: RADIATION ONCOLOGY | Facility: MEDICAL CENTER | Age: 66
End: 2021-06-07
Payer: MEDICARE

## 2021-06-07 LAB
CHEMOTHERAPY INFUSION START DATE: NORMAL
CHEMOTHERAPY RECORDS: 2
CHEMOTHERAPY RECORDS: 2
CHEMOTHERAPY RECORDS: 5000
CHEMOTHERAPY RECORDS: 5000
CHEMOTHERAPY RECORDS: NORMAL
CHEMOTHERAPY RX CANCER: NORMAL
DATE 1ST CHEMO CANCER: NORMAL
RAD ONC ARIA COURSE LAST TREATMENT DATE: NORMAL
RAD ONC ARIA COURSE TREATMENT ELAPSED DAYS: NORMAL
RAD ONC ARIA REFERENCE POINT DOSAGE GIVEN TO DATE: 48
RAD ONC ARIA REFERENCE POINT ID: NORMAL
RAD ONC ARIA REFERENCE POINT SESSION DOSAGE GIVEN: 2

## 2021-06-07 PROCEDURE — 77412 RADIATION TX DELIVERY LVL 3: CPT | Performed by: RADIOLOGY

## 2021-06-08 ENCOUNTER — HOSPITAL ENCOUNTER (OUTPATIENT)
Dept: RADIATION ONCOLOGY | Facility: MEDICAL CENTER | Age: 66
End: 2021-06-08
Payer: MEDICARE

## 2021-06-08 LAB
CHEMOTHERAPY INFUSION START DATE: NORMAL
CHEMOTHERAPY INFUSION START DATE: NORMAL
CHEMOTHERAPY INFUSION STOP DATE: NORMAL
CHEMOTHERAPY RECORDS: 2
CHEMOTHERAPY RECORDS: 5000
CHEMOTHERAPY RECORDS: NORMAL
CHEMOTHERAPY RX CANCER: NORMAL
CHEMOTHERAPY RX CANCER: NORMAL
DATE 1ST CHEMO CANCER: NORMAL
DATE 1ST CHEMO CANCER: NORMAL
RAD ONC ARIA COURSE LAST TREATMENT DATE: NORMAL
RAD ONC ARIA COURSE LAST TREATMENT DATE: NORMAL
RAD ONC ARIA COURSE TREATMENT ELAPSED DAYS: NORMAL
RAD ONC ARIA COURSE TREATMENT ELAPSED DAYS: NORMAL
RAD ONC ARIA REFERENCE POINT DOSAGE GIVEN TO DATE: 50
RAD ONC ARIA REFERENCE POINT ID: NORMAL
RAD ONC ARIA REFERENCE POINT SESSION DOSAGE GIVEN: 2

## 2021-06-08 PROCEDURE — 77412 RADIATION TX DELIVERY LVL 3: CPT | Performed by: RADIOLOGY

## 2021-06-08 PROCEDURE — 77427 RADIATION TX MANAGEMENT X5: CPT | Performed by: RADIOLOGY

## 2022-04-27 ENCOUNTER — HOSPITAL ENCOUNTER (OUTPATIENT)
Dept: RADIOLOGY | Facility: MEDICAL CENTER | Age: 67
End: 2022-04-27
Attending: INTERNAL MEDICINE
Payer: MEDICARE

## 2022-04-27 DIAGNOSIS — M85.89 DISAPPEARING BONE DISEASE: ICD-10-CM

## 2022-04-27 PROCEDURE — 77080 DXA BONE DENSITY AXIAL: CPT

## 2022-05-02 ENCOUNTER — APPOINTMENT (OUTPATIENT)
Dept: RADIOLOGY | Facility: MEDICAL CENTER | Age: 67
End: 2022-05-02
Attending: INTERNAL MEDICINE
Payer: MEDICARE

## 2022-05-02 DIAGNOSIS — E55.9 VITAMIN D DEFICIENCY: ICD-10-CM

## 2022-05-02 DIAGNOSIS — C50.811 MALIGNANT NEOPLASM OF OVERLAPPING SITES OF RIGHT FEMALE BREAST, UNSPECIFIED ESTROGEN RECEPTOR STATUS (HCC): ICD-10-CM

## 2022-05-02 DIAGNOSIS — Z12.31 VISIT FOR SCREENING MAMMOGRAM: ICD-10-CM

## 2022-05-02 PROCEDURE — 77063 BREAST TOMOSYNTHESIS BI: CPT

## 2022-05-06 ENCOUNTER — HOSPITAL ENCOUNTER (OUTPATIENT)
Dept: RADIOLOGY | Facility: MEDICAL CENTER | Age: 67
End: 2022-05-06
Attending: INTERNAL MEDICINE
Payer: MEDICARE

## 2022-05-06 DIAGNOSIS — R92.8 ABNORMAL MAMMOGRAM: ICD-10-CM

## 2022-05-06 PROCEDURE — 76642 ULTRASOUND BREAST LIMITED: CPT | Mod: LT

## 2022-11-07 ENCOUNTER — PATIENT MESSAGE (OUTPATIENT)
Dept: HEALTH INFORMATION MANAGEMENT | Facility: OTHER | Age: 67
End: 2022-11-07

## 2023-10-11 ENCOUNTER — HOSPITAL ENCOUNTER (OUTPATIENT)
Dept: RADIOLOGY | Facility: MEDICAL CENTER | Age: 68
End: 2023-10-11
Attending: INTERNAL MEDICINE
Payer: MEDICARE

## 2023-10-11 DIAGNOSIS — Z12.31 ENCOUNTER FOR SCREENING MAMMOGRAM FOR MALIGNANT NEOPLASM OF BREAST: ICD-10-CM

## 2023-10-11 PROCEDURE — 77063 BREAST TOMOSYNTHESIS BI: CPT

## 2024-04-22 ENCOUNTER — HOSPITAL ENCOUNTER (OUTPATIENT)
Dept: LAB | Facility: MEDICAL CENTER | Age: 69
End: 2024-04-22
Attending: REGISTERED NURSE
Payer: MEDICARE

## 2024-04-22 LAB
25(OH)D3 SERPL-MCNC: 45 NG/ML (ref 30–100)
ALBUMIN SERPL BCP-MCNC: 4.2 G/DL (ref 3.2–4.9)
ALBUMIN/GLOB SERPL: 1.4 G/DL
ALP SERPL-CCNC: 55 U/L (ref 30–99)
ALT SERPL-CCNC: 16 U/L (ref 2–50)
ANION GAP SERPL CALC-SCNC: 11 MMOL/L (ref 7–16)
AST SERPL-CCNC: 17 U/L (ref 12–45)
BASOPHILS # BLD AUTO: 0.7 % (ref 0–1.8)
BASOPHILS # BLD: 0.04 K/UL (ref 0–0.12)
BILIRUB SERPL-MCNC: 0.3 MG/DL (ref 0.1–1.5)
BUN SERPL-MCNC: 16 MG/DL (ref 8–22)
CALCIUM ALBUM COR SERPL-MCNC: 9 MG/DL (ref 8.5–10.5)
CALCIUM SERPL-MCNC: 9.2 MG/DL (ref 8.5–10.5)
CHLORIDE SERPL-SCNC: 104 MMOL/L (ref 96–112)
CHOLEST SERPL-MCNC: 210 MG/DL (ref 100–199)
CO2 SERPL-SCNC: 26 MMOL/L (ref 20–33)
CREAT SERPL-MCNC: 0.98 MG/DL (ref 0.5–1.4)
EOSINOPHIL # BLD AUTO: 0.15 K/UL (ref 0–0.51)
EOSINOPHIL NFR BLD: 2.6 % (ref 0–6.9)
ERYTHROCYTE [DISTWIDTH] IN BLOOD BY AUTOMATED COUNT: 46.7 FL (ref 35.9–50)
GFR SERPLBLD CREATININE-BSD FMLA CKD-EPI: 62 ML/MIN/1.73 M 2
GLOBULIN SER CALC-MCNC: 3.1 G/DL (ref 1.9–3.5)
GLUCOSE SERPL-MCNC: 98 MG/DL (ref 65–99)
HCT VFR BLD AUTO: 43.8 % (ref 37–47)
HDLC SERPL-MCNC: 61 MG/DL
HGB BLD-MCNC: 13.7 G/DL (ref 12–16)
IMM GRANULOCYTES # BLD AUTO: 0.01 K/UL (ref 0–0.11)
IMM GRANULOCYTES NFR BLD AUTO: 0.2 % (ref 0–0.9)
LDLC SERPL CALC-MCNC: 109 MG/DL
LYMPHOCYTES # BLD AUTO: 1.44 K/UL (ref 1–4.8)
LYMPHOCYTES NFR BLD: 25.2 % (ref 22–41)
MCH RBC QN AUTO: 25.5 PG (ref 27–33)
MCHC RBC AUTO-ENTMCNC: 31.3 G/DL (ref 32.2–35.5)
MCV RBC AUTO: 81.4 FL (ref 81.4–97.8)
MONOCYTES # BLD AUTO: 0.4 K/UL (ref 0–0.85)
MONOCYTES NFR BLD AUTO: 7 % (ref 0–13.4)
NEUTROPHILS # BLD AUTO: 3.68 K/UL (ref 1.82–7.42)
NEUTROPHILS NFR BLD: 64.3 % (ref 44–72)
NRBC # BLD AUTO: 0 K/UL
NRBC BLD-RTO: 0 /100 WBC (ref 0–0.2)
PLATELET # BLD AUTO: 251 K/UL (ref 164–446)
PMV BLD AUTO: 9.2 FL (ref 9–12.9)
POTASSIUM SERPL-SCNC: 4.3 MMOL/L (ref 3.6–5.5)
PROT SERPL-MCNC: 7.3 G/DL (ref 6–8.2)
RBC # BLD AUTO: 5.38 M/UL (ref 4.2–5.4)
SODIUM SERPL-SCNC: 141 MMOL/L (ref 135–145)
TRIGL SERPL-MCNC: 198 MG/DL (ref 0–149)
TSH SERPL DL<=0.005 MIU/L-ACNC: 4.89 UIU/ML (ref 0.38–5.33)
WBC # BLD AUTO: 5.7 K/UL (ref 4.8–10.8)

## 2024-04-22 PROCEDURE — 82306 VITAMIN D 25 HYDROXY: CPT

## 2024-04-22 PROCEDURE — 80061 LIPID PANEL: CPT

## 2024-04-22 PROCEDURE — 85025 COMPLETE CBC W/AUTO DIFF WBC: CPT

## 2024-04-22 PROCEDURE — 84443 ASSAY THYROID STIM HORMONE: CPT

## 2024-04-22 PROCEDURE — 36415 COLL VENOUS BLD VENIPUNCTURE: CPT

## 2024-04-22 PROCEDURE — 80053 COMPREHEN METABOLIC PANEL: CPT

## 2024-10-14 ENCOUNTER — HOSPITAL ENCOUNTER (OUTPATIENT)
Dept: RADIOLOGY | Facility: MEDICAL CENTER | Age: 69
End: 2024-10-14
Attending: INTERNAL MEDICINE
Payer: MEDICARE

## 2024-10-14 DIAGNOSIS — Z12.31 ENCOUNTER FOR MAMMOGRAM TO ESTABLISH BASELINE MAMMOGRAM: ICD-10-CM

## 2024-10-14 DIAGNOSIS — M81.0 POSTMENOPAUSAL OSTEOPOROSIS: ICD-10-CM

## 2024-10-14 PROCEDURE — 77080 DXA BONE DENSITY AXIAL: CPT

## 2024-10-14 PROCEDURE — 77067 SCR MAMMO BI INCL CAD: CPT

## (undated) DEVICE — CLOSURE SKIN STRIP 1/2 X 4 IN - (STERI STRIP) (50/BX 4BX/CA)

## (undated) DEVICE — KIT  I.V. START (100EA/CA)

## (undated) DEVICE — PAD MAGNETIC INSTRUMENT FOAM HOLDER (200/CA)

## (undated) DEVICE — SLEEVE, VASO, THIGH, MED

## (undated) DEVICE — KIT ANESTHESIA W/CIRCUIT & 3/LT BAG W/FILTER (20EA/CA)

## (undated) DEVICE — GLOVE BIOGEL SZ 6 PF LATEX - (50EA/BX 4BX/CA)

## (undated) DEVICE — PACK MINOR BASIN - (2EA/CA)

## (undated) DEVICE — SENSOR SPO2 NEO LNCS ADHESIVE (20/BX) SEE USER NOTES

## (undated) DEVICE — CANISTER SUCTION RIGID RED 1500CC (40EA/CA)

## (undated) DEVICE — SODIUM CHL IRRIGATION 0.9% 1000ML (12EA/CA)

## (undated) DEVICE — TOWELS CLOTH SURGICAL - (4/PK 20PK/CA)

## (undated) DEVICE — BLADE ELECTRODE COATED EDGE (50EA/PK)

## (undated) DEVICE — MANIFOLD NEPTUNE 1 PORT (20/PK)

## (undated) DEVICE — SET LEADWIRE 5 LEAD BEDSIDE DISPOSABLE ECG (1SET OF 5/EA)

## (undated) DEVICE — DRESSING TRANSPARENT FILM TEGADERM 2.375 X 2.75"  (100EA/BX)"

## (undated) DEVICE — SUTURE 4-0 MONOCRYL PLUS PS-2 - 27 INCH (36/BX)

## (undated) DEVICE — COVER CIV-FLEX TRANSDUCER - (24/BX)

## (undated) DEVICE — CATHETER IV 20 GA X 1-1/4 ---SURG.& SDS ONLY--- (50EA/BX)

## (undated) DEVICE — GLOVE BIOGEL PI INDICATOR SZ 6.5 SURGICAL PF LF - (50/BX 4BX/CA)

## (undated) DEVICE — SPONGE GAUZESTER. 2X2 4-PL - (2/PK 50PK/BX 30BX/CS)

## (undated) DEVICE — SHEET TRANSVERSE LAP - (12EA/CA)

## (undated) DEVICE — SUTURE GENERAL

## (undated) DEVICE — TUBE CONNECTING SUCTION - CLEAR PLASTIC STERILE 72 IN (50EA/CA)

## (undated) DEVICE — CHLORAPREP 26 ML APPLICATOR - ORANGE TINT(25/CA)

## (undated) DEVICE — HEAD HOLDER JUNIOR/ADULT

## (undated) DEVICE — LACTATED RINGERS INJ 1000 ML - (14EA/CA 60CA/PF)

## (undated) DEVICE — SUCTION INSTRUMENT YANKAUER BULBOUS TIP W/O VENT (50EA/CA)

## (undated) DEVICE — CANISTER SUCTION 3000ML MECHANICAL FILTER AUTO SHUTOFF MEDI-VAC NONSTERILE LF DISP  (40EA/CA)

## (undated) DEVICE — GOWN SURGEONS LARGE - (32/CA)

## (undated) DEVICE — GLOVE BIOGEL INDICATOR SZ 6.5 SURGICAL PF LTX - (50PR/BX 4BX/CA)

## (undated) DEVICE — SET EXTENSION WITH 2 PORTS (48EA/CA) ***PART #2C8610 IS A SUBSTITUTE*****

## (undated) DEVICE — WATER IRRIGATION STERILE 1000ML (12EA/CA)

## (undated) DEVICE — TUBING CLEARLINK DUO-VENT - C-FLO (48EA/CA)

## (undated) DEVICE — ELECTRODE DUAL RETURN W/ CORD - (50/PK)

## (undated) DEVICE — GLOVE SZ 6.5 BIOGEL PI MICRO - PF LF (50PR/BX)

## (undated) DEVICE — PROTECTOR ULNA NERVE - (36PR/CA)

## (undated) DEVICE — SPONGE XRAY 8X4 STERL. 12PL - (10EA/TY 80TY/CA)

## (undated) DEVICE — GOWN WARMING STANDARD FLEX - (30/CA)

## (undated) DEVICE — SUTURE 3-0 VICRYL PLUS SH - 8X 18 INCH (12/BX)

## (undated) DEVICE — ELECTRODE 850 FOAM ADHESIVE - HYDROGEL RADIOTRNSPRNT (50/PK)

## (undated) DEVICE — STAPLER SKIN DISP - (6/BX 10BX/CA) VISISTAT

## (undated) DEVICE — MASK ANESTHESIA ADULT  - (100/CA)